# Patient Record
Sex: FEMALE | Race: WHITE | Employment: OTHER | URBAN - NONMETROPOLITAN AREA
[De-identification: names, ages, dates, MRNs, and addresses within clinical notes are randomized per-mention and may not be internally consistent; named-entity substitution may affect disease eponyms.]

---

## 2017-08-30 ENCOUNTER — HOSPITAL ENCOUNTER (OUTPATIENT)
Dept: CT IMAGING | Age: 82
Discharge: HOME | End: 2017-08-30
Payer: MEDICARE

## 2017-08-30 DIAGNOSIS — Z87.891 PERSONAL HISTORY OF TOBACCO USE, PRESENTING HAZARDS TO HEALTH: ICD-10-CM

## 2017-08-30 PROCEDURE — G0297 LDCT FOR LUNG CA SCREEN: HCPCS

## 2017-10-20 ENCOUNTER — HOSPITAL ENCOUNTER (OUTPATIENT)
Dept: NUCLEAR MEDICINE | Age: 82
Discharge: HOME | End: 2017-10-20
Payer: MEDICARE

## 2017-10-20 ENCOUNTER — HOSPITAL ENCOUNTER (OUTPATIENT)
Dept: PULMONOLOGY | Age: 82
Discharge: HOME | End: 2017-10-20
Payer: MEDICARE

## 2017-10-20 DIAGNOSIS — R91.1 LUNG NODULE: ICD-10-CM

## 2017-10-20 LAB
BASE EXCESS ARTERIAL: 6.3 MMOL/L (ref -2–2)
CARBOXYHEMOGLOBIN ARTERIAL: 1.8 % (ref 0–5)
GLUCOSE BLD-MCNC: 85 MG/DL (ref 70–99)
HCO3 ARTERIAL: 31.8 MMOL/L (ref 22–26)
HEMOGLOBIN, ART, EXTENDED: 11.4 G/DL (ref 12–16)
METHEMOGLOBIN ARTERIAL: 1.3 %
O2 CONTENT ARTERIAL: 15.4 ML/DL
O2 SAT, ARTERIAL: 95.5 %
O2 THERAPY: ABNORMAL
PCO2 ARTERIAL: 49 MMHG (ref 35–45)
PERFORMED ON: NORMAL
PH ARTERIAL: 7.42 (ref 7.35–7.45)
PO2 ARTERIAL: 83 MMHG (ref 80–100)
POTASSIUM, WHOLE BLOOD: 4.4

## 2017-10-20 PROCEDURE — 36600 WITHDRAWAL OF ARTERIAL BLOOD: CPT

## 2017-10-20 PROCEDURE — 94060 EVALUATION OF WHEEZING: CPT

## 2017-10-20 PROCEDURE — 3430000000 HC RX DIAGNOSTIC RADIOPHARMACEUTICAL: Performed by: NURSE PRACTITIONER

## 2017-10-20 PROCEDURE — 82948 REAGENT STRIP/BLOOD GLUCOSE: CPT

## 2017-10-20 PROCEDURE — 6360000002 HC RX W HCPCS: Performed by: NURSE PRACTITIONER

## 2017-10-20 PROCEDURE — A9552 F18 FDG: HCPCS | Performed by: NURSE PRACTITIONER

## 2017-10-20 PROCEDURE — 84132 ASSAY OF SERUM POTASSIUM: CPT

## 2017-10-20 PROCEDURE — 78815 PET IMAGE W/CT SKULL-THIGH: CPT

## 2017-10-20 PROCEDURE — 94729 DIFFUSING CAPACITY: CPT

## 2017-10-20 PROCEDURE — 82803 BLOOD GASES ANY COMBINATION: CPT

## 2017-10-20 RX ORDER — FLUDEOXYGLUCOSE F 18 200 MCI/ML
10 INJECTION, SOLUTION INTRAVENOUS
Status: COMPLETED | OUTPATIENT
Start: 2017-10-20 | End: 2017-10-20

## 2017-10-20 RX ORDER — ALBUTEROL SULFATE 2.5 MG/3ML
2.5 SOLUTION RESPIRATORY (INHALATION) EVERY 6 HOURS PRN
Status: DISCONTINUED | OUTPATIENT
Start: 2017-10-20 | End: 2017-10-22 | Stop reason: HOSPADM

## 2017-10-20 RX ADMIN — FLUDEOXYGLUCOSE F 18 10 MILLICURIE: 200 INJECTION, SOLUTION INTRAVENOUS at 10:38

## 2017-10-20 NOTE — PROGRESS NOTES
Blood Gas, Arterial [[id number]] (Abnormal) Collected: 10/20/17 1115     Specimen: Blood gases Updated: 10/20/17 1116      pH, Arterial 7.420      pCO2, Arterial 49.0 (H) mmHg       pO2, Arterial 83.0 mmHg       HCO3, Arterial 31.8 (H) mmol/L       Base Excess, Arterial 6.3 (H) mmol/L       Hemoglobin, Art, Extended 11.4 (L) g/dL       O2 Sat, Arterial 95.5 %       Carboxyhgb, Arterial 1.8 %       Methemoglobin, Arterial 1.3 %       O2 Content, Arterial 15.4 mL/dL       O2 Therapy Unknown     Potassium, Whole Blood [[id number]] Collected: 10/20/17 1115      Updated: 10/20/17 1116      Potassium, Whole Blood 4.4     2 LPM, RR 18  SITE RR ATT+

## 2018-10-22 ENCOUNTER — APPOINTMENT (OUTPATIENT)
Dept: GENERAL RADIOLOGY | Age: 83
End: 2018-10-22
Payer: MEDICARE

## 2018-10-22 ENCOUNTER — HOSPITAL ENCOUNTER (EMERGENCY)
Age: 83
Discharge: HOME | End: 2018-10-23
Attending: EMERGENCY MEDICINE
Payer: MEDICARE

## 2018-10-22 VITALS
HEIGHT: 64 IN | HEART RATE: 89 BPM | BODY MASS INDEX: 18.95 KG/M2 | WEIGHT: 111 LBS | DIASTOLIC BLOOD PRESSURE: 64 MMHG | SYSTOLIC BLOOD PRESSURE: 138 MMHG | TEMPERATURE: 98.9 F | OXYGEN SATURATION: 95 % | RESPIRATION RATE: 20 BRPM

## 2018-10-22 DIAGNOSIS — R07.81 RIB PAIN ON RIGHT SIDE: Primary | ICD-10-CM

## 2018-10-22 DIAGNOSIS — R07.89 ATYPICAL CHEST PAIN: ICD-10-CM

## 2018-10-22 PROCEDURE — 96374 THER/PROPH/DIAG INJ IV PUSH: CPT

## 2018-10-22 PROCEDURE — 71045 X-RAY EXAM CHEST 1 VIEW: CPT

## 2018-10-22 PROCEDURE — 2580000003 HC RX 258: Performed by: EMERGENCY MEDICINE

## 2018-10-22 PROCEDURE — 99284 EMERGENCY DEPT VISIT MOD MDM: CPT

## 2018-10-22 PROCEDURE — 6360000002 HC RX W HCPCS: Performed by: EMERGENCY MEDICINE

## 2018-10-22 PROCEDURE — 93005 ELECTROCARDIOGRAM TRACING: CPT

## 2018-10-22 RX ORDER — MORPHINE SULFATE/0.9% NACL/PF 1 MG/ML
2 SYRINGE (ML) INJECTION ONCE
Status: COMPLETED | OUTPATIENT
Start: 2018-10-22 | End: 2018-10-22

## 2018-10-22 RX ORDER — 0.9 % SODIUM CHLORIDE 0.9 %
500 INTRAVENOUS SOLUTION INTRAVENOUS ONCE
Status: COMPLETED | OUTPATIENT
Start: 2018-10-22 | End: 2018-10-23

## 2018-10-22 RX ADMIN — SODIUM CHLORIDE 500 ML: 9 INJECTION, SOLUTION INTRAVENOUS at 23:56

## 2018-10-22 RX ADMIN — Medication 2 MG: at 23:54

## 2018-10-22 ASSESSMENT — ENCOUNTER SYMPTOMS
RHINORRHEA: 0
NAUSEA: 0
BACK PAIN: 0
SORE THROAT: 0
ABDOMINAL PAIN: 0
SHORTNESS OF BREATH: 0
VOMITING: 0
DIARRHEA: 0
WHEEZING: 0
COUGH: 1

## 2018-10-22 ASSESSMENT — PAIN DESCRIPTION - LOCATION: LOCATION: RIB CAGE

## 2018-10-22 ASSESSMENT — PAIN SCALES - GENERAL
PAINLEVEL_OUTOF10: 7
PAINLEVEL_OUTOF10: 7

## 2018-10-22 ASSESSMENT — PAIN DESCRIPTION - ORIENTATION: ORIENTATION: RIGHT

## 2018-10-23 ENCOUNTER — APPOINTMENT (OUTPATIENT)
Dept: CT IMAGING | Age: 83
End: 2018-10-23
Payer: MEDICARE

## 2018-10-23 LAB
ALBUMIN SERPL-MCNC: 3.8 G/DL (ref 3.5–5.2)
ALP BLD-CCNC: 76 U/L (ref 35–104)
ALT SERPL-CCNC: 11 U/L (ref 5–33)
ANION GAP SERPL CALCULATED.3IONS-SCNC: 11 MMOL/L (ref 7–19)
APTT: 26.9 SEC (ref 26–36.2)
AST SERPL-CCNC: 17 U/L (ref 5–32)
BASOPHILS ABSOLUTE: 0.1 K/UL (ref 0–0.2)
BASOPHILS RELATIVE PERCENT: 0.8 % (ref 0–1)
BILIRUB SERPL-MCNC: <0.2 MG/DL (ref 0.2–1.2)
BILIRUBIN URINE: NEGATIVE
BLOOD, URINE: NEGATIVE
BUN BLDV-MCNC: 28 MG/DL (ref 8–23)
CALCIUM SERPL-MCNC: 9.2 MG/DL (ref 8.8–10.2)
CHLORIDE BLD-SCNC: 100 MMOL/L (ref 98–111)
CLARITY: CLEAR
CO2: 29 MMOL/L (ref 22–29)
COLOR: YELLOW
CREAT SERPL-MCNC: 1.2 MG/DL (ref 0.5–0.9)
EKG P AXIS: 71 DEGREES
EKG P-R INTERVAL: 178 MS
EKG Q-T INTERVAL: 362 MS
EKG QRS DURATION: 86 MS
EKG QTC CALCULATION (BAZETT): 409 MS
EKG T AXIS: 71 DEGREES
EOSINOPHILS ABSOLUTE: 0.1 K/UL (ref 0–0.6)
EOSINOPHILS RELATIVE PERCENT: 2 % (ref 0–5)
GFR NON-AFRICAN AMERICAN: 43
GLUCOSE BLD-MCNC: 120 MG/DL (ref 74–109)
GLUCOSE URINE: NEGATIVE MG/DL
HCT VFR BLD CALC: 38.2 % (ref 37–47)
HEMOGLOBIN: 11.8 G/DL (ref 12–16)
INR BLD: 0.95 (ref 0.88–1.18)
KETONES, URINE: NEGATIVE MG/DL
LEUKOCYTE ESTERASE, URINE: NEGATIVE
LYMPHOCYTES ABSOLUTE: 1.5 K/UL (ref 1.1–4.5)
LYMPHOCYTES RELATIVE PERCENT: 22.6 % (ref 20–40)
MCH RBC QN AUTO: 29.9 PG (ref 27–31)
MCHC RBC AUTO-ENTMCNC: 30.9 G/DL (ref 33–37)
MCV RBC AUTO: 97 FL (ref 81–99)
MONOCYTES ABSOLUTE: 0.8 K/UL (ref 0–0.9)
MONOCYTES RELATIVE PERCENT: 13 % (ref 0–10)
NEUTROPHILS ABSOLUTE: 4 K/UL (ref 1.5–7.5)
NEUTROPHILS RELATIVE PERCENT: 61.4 % (ref 50–65)
NITRITE, URINE: NEGATIVE
PDW BLD-RTO: 12.2 % (ref 11.5–14.5)
PERFORMED ON: NORMAL
PERFORMED ON: NORMAL
PH UA: 7.5
PLATELET # BLD: 199 K/UL (ref 130–400)
PMV BLD AUTO: 9 FL (ref 9.4–12.3)
POC TROPONIN I: 0.01 NG/ML (ref 0–0.08)
POC TROPONIN I: 0.04 NG/ML (ref 0–0.08)
POTASSIUM SERPL-SCNC: 4.3 MMOL/L (ref 3.5–5)
PROTEIN UA: NEGATIVE MG/DL
PROTHROMBIN TIME: 12.6 SEC (ref 12–14.6)
RBC # BLD: 3.94 M/UL (ref 4.2–5.4)
SODIUM BLD-SCNC: 140 MMOL/L (ref 136–145)
SPECIFIC GRAVITY UA: 1.02
TOTAL PROTEIN: 6.6 G/DL (ref 6.6–8.7)
URINE REFLEX TO CULTURE: NORMAL
UROBILINOGEN, URINE: 0.2 E.U./DL
WBC # BLD: 6.5 K/UL (ref 4.8–10.8)

## 2018-10-23 PROCEDURE — 81003 URINALYSIS AUTO W/O SCOPE: CPT

## 2018-10-23 PROCEDURE — 36415 COLL VENOUS BLD VENIPUNCTURE: CPT

## 2018-10-23 PROCEDURE — 71275 CT ANGIOGRAPHY CHEST: CPT

## 2018-10-23 PROCEDURE — 99284 EMERGENCY DEPT VISIT MOD MDM: CPT | Performed by: EMERGENCY MEDICINE

## 2018-10-23 PROCEDURE — 84484 ASSAY OF TROPONIN QUANT: CPT

## 2018-10-23 PROCEDURE — 85610 PROTHROMBIN TIME: CPT

## 2018-10-23 PROCEDURE — 80053 COMPREHEN METABOLIC PANEL: CPT

## 2018-10-23 PROCEDURE — 6370000000 HC RX 637 (ALT 250 FOR IP): Performed by: EMERGENCY MEDICINE

## 2018-10-23 PROCEDURE — 85730 THROMBOPLASTIN TIME PARTIAL: CPT

## 2018-10-23 PROCEDURE — 6360000004 HC RX CONTRAST MEDICATION: Performed by: EMERGENCY MEDICINE

## 2018-10-23 PROCEDURE — 85025 COMPLETE CBC W/AUTO DIFF WBC: CPT

## 2018-10-23 RX ORDER — LIDOCAINE 50 MG/G
1 PATCH TOPICAL DAILY
Status: DISCONTINUED | OUTPATIENT
Start: 2018-10-23 | End: 2018-10-23 | Stop reason: HOSPADM

## 2018-10-23 RX ORDER — HYDROCODONE BITARTRATE AND ACETAMINOPHEN 5; 325 MG/1; MG/1
1 TABLET ORAL EVERY 6 HOURS PRN
Qty: 10 TABLET | Refills: 0 | Status: SHIPPED | OUTPATIENT
Start: 2018-10-23 | End: 2018-10-26

## 2018-10-23 RX ORDER — LIDOCAINE 50 MG/G
1 PATCH TOPICAL DAILY
Qty: 30 PATCH | Refills: 0 | Status: SHIPPED | OUTPATIENT
Start: 2018-10-23

## 2018-10-23 RX ADMIN — IOPAMIDOL 90 ML: 755 INJECTION, SOLUTION INTRAVENOUS at 00:58

## 2018-10-23 NOTE — ED PROVIDER NOTES
MEDICALHISTORY   No past medical history on file. SURGICAL HISTORY     No past surgical history on file. CURRENT MEDICATIONS     Discharge Medication List as of 10/23/2018  3:52 AM          ALLERGIES     Patient has no known allergies. FAMILY HISTORY     No family history on file. SOCIAL HISTORY       Social History     Social History    Marital status:      Spouse name: N/A    Number of children: N/A    Years of education: N/A     Social History Main Topics    Smoking status: Not on file    Smokeless tobacco: Not on file    Alcohol use Not on file    Drug use: Unknown    Sexual activity: Not on file     Other Topics Concern    Not on file     Social History Narrative    No narrative on file       SCREENINGS    Dawson Coma Scale  Eye Opening: Spontaneous  Best Verbal Response: Oriented  Best Motor Response: Obeys commands  Sarah Coma Scale Score: 15        PHYSICAL EXAM    (up to 7 for level 4, 8 or more for level 5)     ED Triage Vitals [10/22/18 2320]   BP Temp Temp Source Pulse Resp SpO2 Height Weight   138/64 98.9 °F (37.2 °C) Oral 89 20 95 % 5' 4\" (1.626 m) 111 lb (50.3 kg)       Physical Exam   Constitutional: She is oriented to person, place, and time. She appears well-developed and well-nourished. No distress. HENT:   Head: Normocephalic and atraumatic. Right Ear: External ear normal.   Left Ear: External ear normal.   Eyes: Conjunctivae and EOM are normal.   Neck: Normal range of motion. No tracheal deviation present. Cardiovascular: Normal rate, regular rhythm, normal heart sounds and intact distal pulses. No murmur heard. Pulmonary/Chest: No accessory muscle usage. No tachypnea. No respiratory distress. She has decreased breath sounds in the right lower field and the left lower field. She has no wheezes. She has no rhonchi. She has no rales. She exhibits tenderness and bony tenderness. She exhibits no mass, no crepitus, no deformity and no retraction. Abdominal: Soft. She exhibits no mass. There is no tenderness. There is no guarding. Musculoskeletal: Normal range of motion. She exhibits no edema. Neurological: She is alert and oriented to person, place, and time. GCS eye subscore is 4. GCS verbal subscore is 5. GCS motor subscore is 6. Skin: Skin is warm and dry. She is not diaphoretic. Vitals reviewed. DIAGNOSTIC RESULTS     EKG: All EKG's areinterpreted by the Emergency Department Physician who either signs or Co-signs this chart in the absence of a cardiologist.    88, normal sinus rhythm, nondiagnostic EKG    RADIOLOGY:  Non-plain film images such as CT, Ultrasound and MRI are read by the radiologist. Plain radiographic images are visualized and preliminarily interpreted bythe emergency physician with the below findings:          CTA PULMONARY W CONTRAST   Final Result   No evidence of pulmonary embolism. Chronic emphysematous and inflammatory lung changes. Atheromatous changes of the thoracic aorta and coronary arteries. Pulmonary arterial hypertension. Chronic bronchitis with possible mucosal impaction and right middle   lobar atelectasis. Multiple low density nodules/masses in both kidneys probably represent   renal cysts. Further evaluation/correlation with sonography may be   obtained. The above study was initially reviewed and reported by [facility]. I do   not find any discrepancies. Signed by Dr Brennon Francisco on 10/23/2018 7:17 AM      XR CHEST PORTABLE   Final Result   Right heart enlargement. Emphysematous changes with   hyperinflation and atelectasis in the right lung base. Signed by Dr Pravin Schneider on 10/23/2018 6:59 AM      CTACHEST:  No PE. No fractures. No infiltrate, effusion, or pneumothorax. Emphysema, peribronchial thickening,  and right middle lobe mucoid impaction with corresponding mild atelectasis.         LABS:  Labs Reviewed   CBC WITH AUTO DIFFERENTIAL - Abnormal; Notable for the following: patient's illness. I have discussed the risks of use of these medications with patient. CONSULTS:  None    PROCEDURES:  Unless otherwise noted below, none     Procedures    FINAL IMPRESSION      1. Rib pain on right side    2. Atypical chest pain          DISPOSITION/PLAN   DISPOSITION        PATIENT REFERRED TO:  Artur Osman MD  [address] [place] [address]  [phone]    Schedule an appointment as soon as possible for a visit in 2 days        DISCHARGE MEDICATIONS:  Discharge Medication List as of 10/23/2018  3:52 AM      START taking these medications    Details   lidocaine (LIDODERM) 5 % Place 1 patch onto the skin daily 12 hours on, 12 hours off., Disp-30 patch, R-0Print      HYDROcodone-acetaminophen (NORCO) 5-325 MG per tablet Take 1 tablet by mouth every 6 hours as needed for Pain for up to 3 days. Intended supply: 3 days.  Take lowest dose possible to manage pain., Disp-10 tablet, R-0Print                (Please note that portions of this note were completed with a voice recognition program.  Efforts were made to edit thedictations but occasionally words are mis-transcribed.)    Denisa Eden MD (electronically signed)  Attending Emergency Physician        Yoli Cueva MD  10/23/18 0045

## 2018-10-23 NOTE — ED TRIAGE NOTES
Patient arrive by [facility] from home with c/o right rib pain. Patient states the pain began 4 days ago on the left, moves at times, and is now on the right.

## 2019-04-08 ENCOUNTER — TELEPHONE (OUTPATIENT)
Dept: PULMONOLOGY | Facility: CLINIC | Age: 84
End: 2019-04-08

## 2019-04-08 DIAGNOSIS — J44.9 CHRONIC OBSTRUCTIVE PULMONARY DISEASE, UNSPECIFIED COPD TYPE (HCC): Primary | ICD-10-CM

## 2019-04-08 RX ORDER — ARFORMOTEROL TARTRATE 15 UG/2ML
15 SOLUTION RESPIRATORY (INHALATION)
Qty: 360 ML | Refills: 3 | Status: CANCELLED | OUTPATIENT
Start: 2019-04-08

## 2019-04-08 RX ORDER — ARFORMOTEROL TARTRATE 15 UG/2ML
15 SOLUTION RESPIRATORY (INHALATION)
Qty: 360 ML | Refills: 3 | OUTPATIENT
Start: 2019-04-08

## 2019-04-08 NOTE — TELEPHONE ENCOUNTER
I do not see that she has a follow-up appointment.  She will need to make a follow-up appointment before we can refill any medications.

## 2019-04-08 NOTE — TELEPHONE ENCOUNTER
Per my other note, I do not see that she has a follow-up appointment.  We cannot authorize refills unless she has a follow-up appointment.

## 2020-03-09 VITALS
WEIGHT: 114 LBS | BODY MASS INDEX: 19.57 KG/M2 | SYSTOLIC BLOOD PRESSURE: 118 MMHG | OXYGEN SATURATION: 92 % | DIASTOLIC BLOOD PRESSURE: 70 MMHG | HEART RATE: 85 BPM

## 2020-03-20 ENCOUNTER — TELEPHONE (OUTPATIENT)
Dept: PULMONOLOGY | Facility: CLINIC | Age: 85
End: 2020-03-20

## 2020-03-20 NOTE — TELEPHONE ENCOUNTER
This patient is having problems getting Brovana. And was wanting Dr. Keen to change it.    Spoke to the patient and she said she has spoken to Dr. Montilla' office and they have been working on this.    Patient notified she has not been in our office for almost 2 years. She will contact Dr. Montilla' office.

## 2020-04-01 VITALS
BODY MASS INDEX: 19.57 KG/M2 | HEART RATE: 85 BPM | WEIGHT: 114 LBS | DIASTOLIC BLOOD PRESSURE: 70 MMHG | SYSTOLIC BLOOD PRESSURE: 118 MMHG

## 2020-04-01 PROBLEM — D69.6 THROMBOCYTOPENIA (HCC): Status: ACTIVE | Noted: 2020-04-01

## 2020-04-01 PROBLEM — D50.9 IRON DEFICIENCY ANEMIA: Status: ACTIVE | Noted: 2020-04-01

## 2020-04-01 SDOH — HEALTH STABILITY: MENTAL HEALTH: HOW OFTEN DO YOU HAVE A DRINK CONTAINING ALCOHOL?: NEVER

## 2020-04-20 ENCOUNTER — HOSPITAL ENCOUNTER (OUTPATIENT)
Dept: INFUSION THERAPY | Age: 85
End: 2020-04-20

## 2020-06-01 NOTE — PROGRESS NOTES
Progress Note      Pt Name: Ana María Number: 5/13/1932  MRN: [id number]    Date of evaluation: 6/3/2020  History Obtained From:  patient, electronic medical record    CHIEF COMPLAINT:    Chief Complaint   Patient presents with    Other     Immune thrombocytopenia      Current active problems  History ITP    HISTORY OF PRESENT ILLNESS:    Mary Kay Lainez is a 80 y.o.  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 She responded to Gamimune and prednisone. She denies any issue with bleeding or bruising. She reports that she has not been anywhere and was told that her platelet count has dropped. Overall she feels she is doing very well for 80years old. She has had difficulty getting around because her vehicle has stopped working and she has to get a ride now. She does have COPD and uses an inhaler and finds it hard to breathe when the humidity starts increasing. Her blood pressure has been stable on her amlodipine, lisinopril. She takes vitamin D daily. She continues taking Zocor but does not know her cholesterol. She follows up with Dr. Ambreen Cormier next week for her routine six-month follow-up. HEMATOLOGIC HISTORY: ITP 10/08/13  Ms. Ankur Richmond was seen in initial hematologic evaluation on 10/08/13 as an inpatient at hospitals, at the request of Dr. Jose Raul Gaytan for thrombocytopenia. She presented to the emergency department via EMS after developing acute epistaxis. This occured 10/07/13, progressively worsening throughout the day and profusely today. Her platelet count upon presentation in the emergency room was 5,000. Her left nare was packed and hematology consultation was requested. Laboratory review revealed WBC of 4.5 with slight increase in monocytes. Hgb was 10.8 and Platelet count 9,048. PT and PTT were normal at 10.4 and 22.7, respectively. Chemistery profile showed a mildly elevated potassium of 5.3. Her creatinine was 2.0. Ms. Ankur Richmond was transferred to

## 2020-06-03 ENCOUNTER — HOSPITAL ENCOUNTER (OUTPATIENT)
Dept: INFUSION THERAPY | Age: 85
Discharge: HOME | End: 2020-06-03
Payer: MEDICARE

## 2020-06-03 ENCOUNTER — OFFICE VISIT (OUTPATIENT)
Dept: HEMATOLOGY | Age: 85
End: 2020-06-03
Payer: MEDICARE

## 2020-06-03 VITALS
WEIGHT: 111.6 LBS | DIASTOLIC BLOOD PRESSURE: 68 MMHG | TEMPERATURE: 97.4 F | SYSTOLIC BLOOD PRESSURE: 120 MMHG | BODY MASS INDEX: 19.05 KG/M2 | OXYGEN SATURATION: 89 % | HEART RATE: 107 BPM | HEIGHT: 64 IN

## 2020-06-03 DIAGNOSIS — D69.6 THROMBOCYTOPENIA (HCC): ICD-10-CM

## 2020-06-03 DIAGNOSIS — D75.89 MACROCYTOSIS: ICD-10-CM

## 2020-06-03 DIAGNOSIS — D50.9 IRON DEFICIENCY ANEMIA, UNSPECIFIED IRON DEFICIENCY ANEMIA TYPE: ICD-10-CM

## 2020-06-03 LAB
ALBUMIN SERPL-MCNC: 4.1 G/DL (ref 3.5–5.2)
ALP BLD-CCNC: 65 U/L (ref 35–104)
ALT SERPL-CCNC: 10 U/L (ref 9–52)
ANION GAP SERPL CALCULATED.3IONS-SCNC: 7 MMOL/L (ref 7–19)
AST SERPL-CCNC: 23 U/L (ref 14–36)
BASOPHILS ABSOLUTE: 0.07 K/UL (ref 0.01–0.08)
BASOPHILS RELATIVE PERCENT: 1.2 % (ref 0.1–1.2)
BILIRUB SERPL-MCNC: 0.4 MG/DL (ref 0.2–1.3)
BUN BLDV-MCNC: 27 MG/DL (ref 7–17)
CALCIUM SERPL-MCNC: 9.1 MG/DL (ref 8.4–10.2)
CHLORIDE BLD-SCNC: 99 MMOL/L (ref 98–111)
CO2: 34 MMOL/L (ref 22–29)
CREAT SERPL-MCNC: 1.3 MG/DL (ref 0.5–1)
EOSINOPHILS ABSOLUTE: 0.28 K/UL (ref 0.04–0.54)
EOSINOPHILS RELATIVE PERCENT: 4.7 % (ref 0.7–7)
FOLATE: 13.3 NG/ML (ref 2.7–20)
GFR NON-AFRICAN AMERICAN: 39
GLOBULIN: 2.3 G/DL
GLUCOSE BLD-MCNC: 188 MG/DL (ref 74–106)
HCT VFR BLD CALC: 40.3 % (ref 34.1–44.9)
HEMOGLOBIN: 12.4 G/DL (ref 11.2–15.7)
LYMPHOCYTES ABSOLUTE: 1.46 K/UL (ref 1.18–3.74)
LYMPHOCYTES RELATIVE PERCENT: 24.5 % (ref 19.3–53.1)
MCH RBC QN AUTO: 30.9 PG (ref 25.6–32.2)
MCHC RBC AUTO-ENTMCNC: 30.8 G/DL (ref 32.3–35.5)
MCV RBC AUTO: 100.5 FL (ref 79.4–94.8)
MONOCYTES ABSOLUTE: 0.86 K/UL (ref 0.24–0.82)
MONOCYTES RELATIVE PERCENT: 14.4 % (ref 4.7–12.5)
NEUTROPHILS ABSOLUTE: 3.29 K/UL (ref 1.56–6.13)
NEUTROPHILS RELATIVE PERCENT: 55.2 % (ref 34–71.1)
PDW BLD-RTO: 12.8 % (ref 11.7–14.4)
PLATELET # BLD: 199 K/UL (ref 182–369)
PMV BLD AUTO: 9.5 FL (ref 7.4–10.4)
POTASSIUM SERPL-SCNC: 5.3 MMOL/L (ref 3.5–5.1)
RBC # BLD: 4.01 M/UL (ref 3.93–5.22)
SODIUM BLD-SCNC: 140 MMOL/L (ref 137–145)
TOTAL PROTEIN: 6.4 G/DL (ref 6.3–8.2)
VITAMIN B-12: 481 PG/ML (ref 239–931)
WBC # BLD: 5.96 K/UL (ref 3.98–10.04)

## 2020-06-03 PROCEDURE — 85025 COMPLETE CBC W/AUTO DIFF WBC: CPT

## 2020-06-03 PROCEDURE — 99213 OFFICE O/P EST LOW 20 MIN: CPT | Performed by: PHYSICIAN ASSISTANT

## 2020-06-03 PROCEDURE — 82607 VITAMIN B-12: CPT

## 2020-06-03 PROCEDURE — 80053 COMPREHEN METABOLIC PANEL: CPT

## 2020-06-03 PROCEDURE — 82746 ASSAY OF FOLIC ACID SERUM: CPT

## 2020-06-03 PROCEDURE — 99211 OFF/OP EST MAY X REQ PHY/QHP: CPT

## 2020-06-03 RX ORDER — LISINOPRIL 2.5 MG/1
2.5 TABLET ORAL DAILY
COMMUNITY

## 2020-06-03 RX ORDER — SIMVASTATIN 20 MG
20 TABLET ORAL DAILY
COMMUNITY
Start: 2016-10-28

## 2020-06-03 RX ORDER — TIOTROPIUM BROMIDE 18 UG/1
18 CAPSULE ORAL; RESPIRATORY (INHALATION) DAILY
COMMUNITY
Start: 2016-10-28

## 2020-06-03 RX ORDER — AMLODIPINE BESYLATE 5 MG/1
5 TABLET ORAL DAILY
COMMUNITY

## 2020-06-03 RX ORDER — CARBOXYMETHYLCELLULOSE SODIUM 5 MG/ML
SOLUTION/ DROPS OPHTHALMIC 3 TIMES DAILY PRN
COMMUNITY

## 2020-06-03 RX ORDER — CLONAZEPAM 0.5 MG/1
0.5 TABLET ORAL 2 TIMES DAILY
COMMUNITY
Start: 2016-11-30

## 2020-06-03 RX ORDER — ALBUTEROL SULFATE 90 UG/1
2 AEROSOL, METERED RESPIRATORY (INHALATION) EVERY 4 HOURS PRN
COMMUNITY

## 2020-06-03 RX ORDER — ERGOCALCIFEROL 1.25 MG/1
50000 CAPSULE ORAL WEEKLY
COMMUNITY
End: 2020-06-03

## 2020-06-03 ASSESSMENT — ENCOUNTER SYMPTOMS
EYE DISCHARGE: 0
COLOR CHANGE: 0
SORE THROAT: 0
VOICE CHANGE: 0
BLOOD IN STOOL: 0
ABDOMINAL PAIN: 0
COUGH: 0
TROUBLE SWALLOWING: 0
WHEEZING: 0
DIARRHEA: 0
EYE ITCHING: 0
BACK PAIN: 0
VOMITING: 0
CONSTIPATION: 0
SHORTNESS OF BREATH: 1
ABDOMINAL DISTENTION: 0
PHOTOPHOBIA: 0
NAUSEA: 0

## 2022-11-15 ENCOUNTER — HOSPITAL ENCOUNTER (EMERGENCY)
Age: 87
Discharge: HOME | End: 2022-11-15
Payer: MEDICARE

## 2022-11-15 ENCOUNTER — APPOINTMENT (OUTPATIENT)
Dept: GENERAL RADIOLOGY | Age: 87
End: 2022-11-15
Payer: MEDICARE

## 2022-11-15 ENCOUNTER — APPOINTMENT (OUTPATIENT)
Dept: CT IMAGING | Age: 87
End: 2022-11-15
Payer: MEDICARE

## 2022-11-15 VITALS
OXYGEN SATURATION: 96 % | RESPIRATION RATE: 17 BRPM | HEART RATE: 96 BPM | TEMPERATURE: 98 F | HEIGHT: 64 IN | BODY MASS INDEX: 17.24 KG/M2 | WEIGHT: 101 LBS | DIASTOLIC BLOOD PRESSURE: 72 MMHG | SYSTOLIC BLOOD PRESSURE: 130 MMHG

## 2022-11-15 DIAGNOSIS — M51.36 LUMBAR DEGENERATIVE DISC DISEASE: ICD-10-CM

## 2022-11-15 DIAGNOSIS — N28.1 RENAL CYST: ICD-10-CM

## 2022-11-15 DIAGNOSIS — M54.50 ACUTE LOW BACK PAIN WITHOUT SCIATICA, UNSPECIFIED BACK PAIN LATERALITY: ICD-10-CM

## 2022-11-15 DIAGNOSIS — K80.80 BILIARY CALCULUS OF OTHER SITE WITHOUT OBSTRUCTION: Primary | ICD-10-CM

## 2022-11-15 DIAGNOSIS — M47.812 CERVICAL SPONDYLOSIS: ICD-10-CM

## 2022-11-15 LAB
BILIRUBIN URINE: NEGATIVE
BLOOD, URINE: NEGATIVE
CLARITY: CLEAR
COLOR: YELLOW
GLUCOSE URINE: NEGATIVE MG/DL
KETONES, URINE: NEGATIVE MG/DL
LEUKOCYTE ESTERASE, URINE: NEGATIVE
NITRITE, URINE: NEGATIVE
PH UA: 7 (ref 5–8)
PROTEIN UA: NEGATIVE MG/DL
SPECIFIC GRAVITY UA: 1.01 (ref 1–1.03)
UROBILINOGEN, URINE: 0.2 E.U./DL

## 2022-11-15 PROCEDURE — 73502 X-RAY EXAM HIP UNI 2-3 VIEWS: CPT

## 2022-11-15 PROCEDURE — 99284 EMERGENCY DEPT VISIT MOD MDM: CPT

## 2022-11-15 PROCEDURE — 96374 THER/PROPH/DIAG INJ IV PUSH: CPT

## 2022-11-15 PROCEDURE — 6360000002 HC RX W HCPCS: Performed by: PHYSICIAN ASSISTANT

## 2022-11-15 PROCEDURE — 81003 URINALYSIS AUTO W/O SCOPE: CPT

## 2022-11-15 PROCEDURE — 6370000000 HC RX 637 (ALT 250 FOR IP): Performed by: PHYSICIAN ASSISTANT

## 2022-11-15 PROCEDURE — 72125 CT NECK SPINE W/O DYE: CPT

## 2022-11-15 PROCEDURE — 72131 CT LUMBAR SPINE W/O DYE: CPT

## 2022-11-15 RX ORDER — LIDOCAINE 50 MG/G
OINTMENT TOPICAL
Qty: 50 G | Refills: 0 | Status: SHIPPED | OUTPATIENT
Start: 2022-11-15

## 2022-11-15 RX ORDER — MORPHINE SULFATE 2 MG/ML
2 INJECTION, SOLUTION INTRAMUSCULAR; INTRAVENOUS ONCE
Status: DISCONTINUED | OUTPATIENT
Start: 2022-11-15 | End: 2022-11-15 | Stop reason: HOSPADM

## 2022-11-15 RX ORDER — ACETAMINOPHEN 500 MG
1000 TABLET ORAL
Status: COMPLETED | OUTPATIENT
Start: 2022-11-15 | End: 2022-11-15

## 2022-11-15 RX ORDER — NAPROXEN 500 MG/1
500 TABLET ORAL 2 TIMES DAILY WITH MEALS
Qty: 12 TABLET | Refills: 1 | Status: SHIPPED | OUTPATIENT
Start: 2022-11-15

## 2022-11-15 RX ORDER — ONDANSETRON 2 MG/ML
4 INJECTION INTRAMUSCULAR; INTRAVENOUS ONCE
Status: COMPLETED | OUTPATIENT
Start: 2022-11-15 | End: 2022-11-15

## 2022-11-15 RX ADMIN — ACETAMINOPHEN 1000 MG: 500 TABLET ORAL at 09:51

## 2022-11-15 RX ADMIN — ONDANSETRON 4 MG: 2 INJECTION INTRAMUSCULAR; INTRAVENOUS at 09:52

## 2022-11-15 ASSESSMENT — ENCOUNTER SYMPTOMS
BACK PAIN: 1
VOMITING: 0
RESPIRATORY NEGATIVE: 1
ABDOMINAL PAIN: 0
NAUSEA: 0
DIARRHEA: 0
GASTROINTESTINAL NEGATIVE: 1
EYES NEGATIVE: 1

## 2022-11-15 ASSESSMENT — PAIN SCALES - GENERAL: PAINLEVEL_OUTOF10: 5

## 2022-11-15 NOTE — ED PROVIDER NOTES
[facility] EMERGENCY DEPT  EMERGENCY DEPARTMENT ENCOUNTER      Pt Name: Laly Zhang  MRN: [id number]  [place] 5/13/1932  Date of evaluation: 11/15/2022  Provider: Mamie Abdi PA-C    CHIEF COMPLAINT       Chief Complaint   Patient presents with    Back Pain     Chronic, worse x 3 days         HISTORY OF PRESENT ILLNESS   (Location/Symptom, Timing/Onset, Context/Setting, Quality, Duration, Modifying Factors, Severity)  Note limiting factors. HPI      Laly Zhang is a 80 y.o. female who presents to the emergency department via EMS with neck and back pain. PMH significant for CHF, history of AAA, hypertension, migraines, COPD with long-term 2 L nasal cannula oxygen, arthritis. Patient states approximately 2 weeks ago she was sitting in [place] when she turned her head to the right and developed a sudden pain and tightness on the right side of her neck. Patient reports since that time the pain is radiated down to her lumbar spine, into her right hip. She describes \"it just keeps cycling between all these areas. \" Patient states that she uses a cane at baseline and has had increased difficulty with walking due to the pain in her lumbar spine. Patient denies numbness or weakness of her extremities, saddle anesthesia. Patient denies incontinence of bowel or bladder. Patient states that due to the pain she is not getting up and cooking food for herself as frequently because of this she is eating less and has since developed decreased urination and dysuria over the past few days. Patient denies abdominal pain, nausea, vomiting, diarrhea, fevers, chills, or diaphoresis. Patient denies any other falls, trauma, new movement/lifting/twisting/bending. Patient has been trying to take Tylenol with her last dose yesterday as well as apply lidocaine however describes that the pain continues to return and she \"can no longer take it. \"        Records reviewed show patient was last seen in the ED on 10/22/2018 for right rib pain, atypical chest pain. Patient last in an outpatient setting at hematology oncology office on 6/3/2020 for monitoring of immune thrombocytopenia, macrocytosis, monocytosis. Nursing Notes were reviewed. REVIEW OF SYSTEMS    (2-9 systems for level 4, 10 or more for level 5)     Review of Systems   Constitutional: Negative. Negative for chills, diaphoresis and fever. Eyes: Negative. Respiratory: Negative. Cardiovascular: Negative. Gastrointestinal: Negative. Negative for abdominal pain, diarrhea, nausea and vomiting. Genitourinary:  Positive for decreased urine volume and dysuria. Negative for flank pain. Denies urinary retention   Musculoskeletal:  Positive for arthralgias (right hip), back pain (lower), gait problem (due to pain in back) and neck pain. Skin: Negative. Negative for rash and wound. Neurological:  Negative for weakness and numbness. Denies saddle anesthesia  Denies incontinence of bowel or bladder   Psychiatric/Behavioral: Negative. All other systems reviewed and are negative. Except as noted above the remainder of the review of systems was reviewed and negative. PAST MEDICAL HISTORY     Past Medical History:   Diagnosis Date    Abdominal aortic aneurysm (AAA)     Anemia     iron deficiecy    Anxiety     Arthritis     CHF (congestive heart failure) (HCC)     COPD (chronic obstructive pulmonary disease) ([facility] [address]     Depression     H/O idiopathic thrombocytopenic purpura     Hypertension     Migraines          SURGICAL HISTORY       Past Surgical History:   Procedure Laterality Date    BONE MARROW BIOPSY      COLONOSCOPY  05/28/2013    @[facility] per Deidre TORRE AND BSO (CERVIX REMOVED)           CURRENT MEDICATIONS       Discharge Medication List as of 11/15/2022 11:00 AM        CONTINUE these medications which have NOT CHANGED    Details   clonazePAM (KLONOPIN) 0.5 MG tablet Take 0.5 mg by mouth 2 times daily. Historical Med      simvastatin (ZOCOR) 20 MG tablet Take 20 mg by mouth dailyHistorical Med      amLODIPine (NORVASC) 5 MG tablet Take 5 mg by mouth dailyHistorical Med      albuterol sulfate  (90 Base) MCG/ACT inhaler Inhale 2 puffs into the lungs every 4 hours as neededHistorical Med      carboxymethylcellulose (REFRESH PLUS) 0.5 % SOLN ophthalmic solution 3 times daily as neededHistorical Med      tiotropium (SPIRIVA HANDIHALER) 18 MCG inhalation capsule Inhale 18 mcg into the lungs dailyHistorical Med      lisinopril (PRINIVIL;ZESTRIL) 2.5 MG tablet Take 2.5 mg by mouth dailyHistorical Med      Cholecalciferol (VITAMIN D3) 125 MCG (5000 UT) TABS Take 1 capsule by mouth dailyHistorical Med      lidocaine (LIDODERM) 5 % Place 1 patch onto the skin daily 12 hours on, 12 hours off., Disp-30 patch, R-0Print             ALLERGIES     Patient has no known allergies. FAMILY HISTORY     History reviewed. No pertinent family history. SOCIAL HISTORY       Social History     Socioeconomic History    Marital status:      Spouse name: None    Number of children: None    Years of education: None    Highest education level: None   Tobacco Use    Smoking status: Former    Smokeless tobacco: Never   Substance and Sexual Activity    Alcohol use: Never    Drug use: Never       SCREENINGS         Sarah Coma Scale  Eye Opening: Spontaneous  Best Verbal Response: Oriented  Best Motor Response: Obeys commands  [place] Coma Scale Score: 15                     CIWA Assessment  BP: 130/72  Heart Rate: 96                 PHYSICAL EXAM    (up to 7 for level 4, 8 or more for level 5)     ED Triage Vitals [11/15/22 0819]   BP Temp Temp Source Heart Rate Resp SpO2 Height Weight   136/67 98 °F (36.7 °C) Oral 96 17 95 % 5' 4\" (1.626 m) 101 lb (45.8 kg)       Physical Exam  Vitals and nursing note reviewed. Constitutional:       General: She is in acute distress (appears uncomfortable due to pain). Appearance: Normal appearance.  She is well-developed, well-groomed and underweight. She is not toxic-appearing or diaphoretic. HENT:      Head: Normocephalic and atraumatic. Mouth/Throat:      Mouth: Mucous membranes are moist.      Pharynx: Oropharynx is clear. Eyes:      Conjunctiva/sclera: Conjunctivae normal.      Pupils: Pupils are equal, round, and reactive to light. Cardiovascular:      Rate and Rhythm: Tachycardia present. Pulmonary:      Effort: Pulmonary effort is normal.      Breath sounds: Normal breath sounds. Chest:      Chest wall: No tenderness. Abdominal:      General: Bowel sounds are normal.      Palpations: Abdomen is soft. Tenderness: There is no abdominal tenderness. There is no right CVA tenderness or left CVA tenderness. Musculoskeletal:         General: Tenderness (inferior half of lumbar midline, right lumbar paraspinal muscualr region; right lateral hip) present. No signs of injury. Cervical back: Normal range of motion and neck supple. Tenderness (right sided paraspinal musicular region, inferior midline) present. No rigidity. Right lower leg: No edema. Left lower leg: No edema. Comments: Full but painful range of motion of the right hip with distally no bony tenderness, no joint swelling. Normal inspection of bilateral upper extremities as well as left lower extremities with no joint swelling, no decreased range of motion, no bony tenderness. Midline tenderness to the lower aspect of the cervical as well as lumbar spine with right-sided paraspinal soft tissue tenderness. No abnormalities to the left paraspinal muscular region throughout the spine. No thoracic midline spinal tenderness. No overlying injuries to the back. Skin:     Findings: No signs of injury, rash or wound. Neurological:      Mental Status: She is alert and oriented to person, place, and time. Sensory: Sensation is intact.    Psychiatric:         Mood and Affect: Mood normal.         Speech: Speech normal. Behavior: Behavior normal. Behavior is cooperative. DIAGNOSTIC RESULTS     EKG: All EKG's are interpreted by the Emergency Department Physician who either signs or Co-signs this chart in the absence of a cardiologist.        RADIOLOGY:   Non-plain film images such as CT, Ultrasound and MRI are read by the radiologist. Plain radiographic images are visualized and preliminarily interpreted by the emergency physician with the below findings:        Interpretation per the Radiologist below, if available at the time of this note:    XR HIP 2-3 VW W PELVIS RIGHT   Final Result   The bones are diffusely demineralized. No acute osseous abnormality. No fracture. No dislocation. Mild to moderate right and mild left hip degenerative changes. Mild bilateral sacroiliac and moderate lower lumbar degenerative changes. The soft tissues appear unremarkable. CT LUMBAR SPINE WO CONTRAST   Final Result   1. No acute osseous abnormality of the lumbar spine. 2.Mild multilevel lumbar spondylosis. If there is ongoing concern for   neurologic impingement, recommend lumbar spine MRI. 3.Multiple indeterminate renal cystic lesions. Recommend nonemergent renal   ultrasound for characterization. 4.Cholelithiasis without cholecystitis. CT CERVICAL SPINE WO CONTRAST   Final Result   No acute osseous abnormality of the cervical spine. Mild multilevel cervical spondylosis. Mild right foraminal narrowing at C3-4 due   to uncinate/facet hypertrophy. No high-grade central canal stenosis is seen. If   there is ongoing concern for neural impingement, recommend cervical spine MRI. LABS:  Labs Reviewed   URINALYSIS       All other labs were within normal range or not returned as of this dictation.     EMERGENCY DEPARTMENT COURSE and DIFFERENTIAL DIAGNOSIS/MDM:   Vitals:    Vitals:    11/15/22 0819 11/15/22 1053   BP: 136/67 130/72   Pulse: 96 96   Resp: 17 17   Temp: 98 °F (36.7 °C)    TempSrc: Oral    SpO2: 95% 96% Weight: 101 lb (45.8 kg)    Height: 5' 4\" (1.626 m)            MDM     Amount and/or Complexity of Data Reviewed  Clinical lab tests: reviewed and ordered  Tests in the radiology section of CPT®: reviewed and ordered  Tests in the medicine section of CPT®: ordered and reviewed  Decide to obtain previous medical records or to obtain history from someone other than the patient: yes  Review and summarize past medical records: yes  Discuss the patient with other providers: yes          REASSESSMENT        CONSULTS:  None    PROCEDURES:  Unless otherwise noted below, none     Procedures        Leida Cesar is a [address] y.o. female who presents to the emergency department via EMS with neck and back pain. PMH significant for CHF, history of AAA, hypertension, migraines, COPD with long-term 2 L nasal cannula oxygen, arthritis. Cervical spine CT showed: No acute abnormalities, multilevel cervical spondylosis and foraminal narrowing at C3-C4. Lumbar spine CT showed: No acute abnormalities, mild multilevel lumbar spondylosis, multiple indeterminant cystic renal lesions, cholelithiasis without cholecystitis. X-ray imaging of the right hip showed: Demineralized bones, no acute abnormalities, mild to moderate right and mild left hip degenerative changes, bilateral mild sacroiliac and moderate lower lumbar degenerative changes. Urinalysis revealed no acute findings. Patient was given a dose of Tylenol and offered pain medication however after the Tylenol she was able to rest comfortably with significantly improved pain, ability to ambulate at her baseline, and stable vital signs. Discussed with patient degenerative changes and need for close follow-up with her primary care provider within the next 48 hours to initiate symptomatic treatment, physical therapy. Advised use of anti-inflammatories, topical lidocaine.   Discussed tricked return precautions and need for immediate return to ED should she develop any new or worsening symptoms. Discussed incidental findings of cholelithiasis, renal cyst, and ability to follow-up with her primary care provider for long-term monitoring and management. Patient is appreciative with no further questions, concerns, needs at this time and is stable for discharge. FINAL IMPRESSION      1. Biliary calculus of other site without obstruction    2. Renal cyst    3. Cervical spondylosis    4. Lumbar degenerative disc disease    5. Acute low back pain without sciatica, unspecified back pain laterality          DISPOSITION/PLAN   DISPOSITION Decision To Discharge 11/15/2022 10:55:28 AM      PATIENT REFERRED TO:  Warden Arredondo  [facility] [address]  [phone]    Schedule an appointment as soon as possible for a visit in 2 days      [facility] EMERGENCY DEPT  [address]undio Florentino  [phone]    As needed    DISCHARGE MEDICATIONS:  Discharge Medication List as of 11/15/2022 11:00 AM        START taking these medications    Details   lidocaine (XYLOCAINE) 5 % ointment Apply topically as needed. , Disp-50 g, R-0, Print      naproxen (NAPROSYN) 500 MG tablet Take 1 tablet by mouth 2 times daily (with meals), Disp-12 tablet, R-1Print           Controlled Substances Monitoring:     No flowsheet data found.     (Please note that portions of this note were completed with a voice recognition program.  Efforts were made to edit the dictations but occasionally words are mis-transcribed.)    Emilio Mcdermott PA-C (electronically signed)            Emilio Mcdermott PA-C  11/15/22 [id number]

## 2022-11-15 NOTE — DISCHARGE INSTRUCTIONS
Please continue using anti-inflammatories and the lidocaine gel to help with your pain. Please apply heat to your neck and back followed by gentle range of motion stretching. Please follow-up with your primary care provider within the next 48 hours for reevaluation and to discuss physical therapy. Should you develop any new or worsening symptoms please return to the ER for further evaluation.

## 2023-08-10 ENCOUNTER — APPOINTMENT (OUTPATIENT)
Dept: GENERAL RADIOLOGY | Age: 88
DRG: 947 | End: 2023-08-10
Payer: MEDICARE

## 2023-08-10 ENCOUNTER — APPOINTMENT (OUTPATIENT)
Dept: CT IMAGING | Age: 88
DRG: 947 | End: 2023-08-10
Payer: MEDICARE

## 2023-08-10 ENCOUNTER — HOSPITAL ENCOUNTER (INPATIENT)
Age: 88
LOS: 1 days | Discharge: HOME HEALTH | DRG: 947 | End: 2023-08-11
Attending: STUDENT IN AN ORGANIZED HEALTH CARE EDUCATION/TRAINING PROGRAM | Admitting: HOSPITALIST
Payer: MEDICARE

## 2023-08-10 DIAGNOSIS — R05.2 SUBACUTE COUGH: Primary | ICD-10-CM

## 2023-08-10 DIAGNOSIS — J44.9 CHRONIC OBSTRUCTIVE PULMONARY DISEASE, UNSPECIFIED COPD TYPE (HCC): ICD-10-CM

## 2023-08-10 PROBLEM — R93.89 ABNORMAL CHEST CT: Status: ACTIVE | Noted: 2023-08-10

## 2023-08-10 PROBLEM — Z86.2 HISTORY OF IDIOPATHIC THROMBOCYTOPENIC PURPURA: Status: ACTIVE | Noted: 2023-08-10

## 2023-08-10 PROBLEM — R53.1 WEAKNESS: Status: ACTIVE | Noted: 2023-08-10

## 2023-08-10 PROBLEM — R93.89 ABNORMAL CT OF THE CHEST: Status: ACTIVE | Noted: 2023-08-10

## 2023-08-10 PROBLEM — R06.02 SHORTNESS OF BREATH: Status: ACTIVE | Noted: 2023-08-10

## 2023-08-10 PROBLEM — Z86.2 HISTORY OF ANEMIA: Status: ACTIVE | Noted: 2023-08-10

## 2023-08-10 LAB
ALBUMIN SERPL-MCNC: 3.9 G/DL (ref 3.5–5.2)
ALP SERPL-CCNC: 68 U/L (ref 35–104)
ALT SERPL-CCNC: <5 U/L (ref 5–33)
ANION GAP SERPL CALCULATED.3IONS-SCNC: 7 MMOL/L (ref 7–19)
AST SERPL-CCNC: 13 U/L (ref 5–32)
BASOPHILS # BLD: 0.1 K/UL (ref 0–0.2)
BASOPHILS NFR BLD: 1.9 % (ref 0–1)
BILIRUB SERPL-MCNC: 0.3 MG/DL (ref 0.2–1.2)
BUN SERPL-MCNC: 20 MG/DL (ref 8–23)
CALCIUM SERPL-MCNC: 9.1 MG/DL (ref 8.2–9.6)
CHLORIDE SERPL-SCNC: 103 MMOL/L (ref 98–111)
CO2 SERPL-SCNC: 34 MMOL/L (ref 22–29)
CREAT SERPL-MCNC: 1.1 MG/DL (ref 0.5–0.9)
EOSINOPHIL # BLD: 0.2 K/UL (ref 0–0.6)
EOSINOPHIL NFR BLD: 3.9 % (ref 0–5)
ERYTHROCYTE [DISTWIDTH] IN BLOOD BY AUTOMATED COUNT: 12.6 % (ref 11.5–14.5)
GLUCOSE SERPL-MCNC: 88 MG/DL (ref 74–109)
HCT VFR BLD AUTO: 35.4 % (ref 37–47)
HGB BLD-MCNC: 10.1 G/DL (ref 12–16)
HYPOCHROMIA BLD QL SMEAR: ABNORMAL
IMM GRANULOCYTES # BLD: 0 K/UL
LYMPHOCYTES # BLD: 0.6 K/UL (ref 1.1–4.5)
LYMPHOCYTES NFR BLD: 14.4 % (ref 20–40)
MAGNESIUM SERPL-MCNC: 2.1 MG/DL (ref 1.7–2.3)
MCH RBC QN AUTO: 26.3 PG (ref 27–31)
MCHC RBC AUTO-ENTMCNC: 28.5 G/DL (ref 33–37)
MCV RBC AUTO: 92.2 FL (ref 81–99)
MONOCYTES # BLD: 0.5 K/UL (ref 0–0.9)
MONOCYTES NFR BLD: 12.2 % (ref 0–10)
NEUTROPHILS # BLD: 2.8 K/UL (ref 1.5–7.5)
NEUTS SEG NFR BLD: 67.4 % (ref 50–65)
OVALOCYTES BLD QL SMEAR: ABNORMAL
PLATELET # BLD AUTO: 252 K/UL (ref 130–400)
PLATELET SLIDE REVIEW: ADEQUATE
PMV BLD AUTO: 9 FL (ref 9.4–12.3)
POTASSIUM SERPL-SCNC: 4.7 MMOL/L (ref 3.5–5)
PROT SERPL-MCNC: 6.5 G/DL (ref 6.6–8.7)
RBC # BLD AUTO: 3.84 M/UL (ref 4.2–5.4)
SODIUM SERPL-SCNC: 144 MMOL/L (ref 136–145)
TROPONIN T SERPL-MCNC: <0.01 NG/ML (ref 0–0.03)
WBC # BLD AUTO: 4.1 K/UL (ref 4.8–10.8)

## 2023-08-10 PROCEDURE — 74176 CT ABD & PELVIS W/O CONTRAST: CPT

## 2023-08-10 PROCEDURE — 2580000003 HC RX 258: Performed by: NURSE PRACTITIONER

## 2023-08-10 PROCEDURE — 85025 COMPLETE CBC W/AUTO DIFF WBC: CPT

## 2023-08-10 PROCEDURE — 99285 EMERGENCY DEPT VISIT HI MDM: CPT

## 2023-08-10 PROCEDURE — 36415 COLL VENOUS BLD VENIPUNCTURE: CPT

## 2023-08-10 PROCEDURE — 6360000002 HC RX W HCPCS: Performed by: NURSE PRACTITIONER

## 2023-08-10 PROCEDURE — 71045 X-RAY EXAM CHEST 1 VIEW: CPT

## 2023-08-10 PROCEDURE — 94640 AIRWAY INHALATION TREATMENT: CPT

## 2023-08-10 PROCEDURE — 94760 N-INVAS EAR/PLS OXIMETRY 1: CPT

## 2023-08-10 PROCEDURE — 71250 CT THORAX DX C-: CPT

## 2023-08-10 PROCEDURE — 6370000000 HC RX 637 (ALT 250 FOR IP): Performed by: NURSE PRACTITIONER

## 2023-08-10 PROCEDURE — 1210000000 HC MED SURG R&B

## 2023-08-10 PROCEDURE — 84484 ASSAY OF TROPONIN QUANT: CPT

## 2023-08-10 PROCEDURE — 83735 ASSAY OF MAGNESIUM: CPT

## 2023-08-10 PROCEDURE — 2700000000 HC OXYGEN THERAPY PER DAY

## 2023-08-10 PROCEDURE — 80053 COMPREHEN METABOLIC PANEL: CPT

## 2023-08-10 PROCEDURE — 93005 ELECTROCARDIOGRAM TRACING: CPT

## 2023-08-10 RX ORDER — IPRATROPIUM BROMIDE AND ALBUTEROL SULFATE 2.5; .5 MG/3ML; MG/3ML
1 SOLUTION RESPIRATORY (INHALATION)
Status: DISCONTINUED | OUTPATIENT
Start: 2023-08-10 | End: 2023-08-11 | Stop reason: HOSPADM

## 2023-08-10 RX ORDER — CARBOXYMETHYLCELLULOSE SODIUM 5 MG/ML
2 SOLUTION/ DROPS OPHTHALMIC 3 TIMES DAILY PRN
Status: DISCONTINUED | OUTPATIENT
Start: 2023-08-10 | End: 2023-08-11 | Stop reason: HOSPADM

## 2023-08-10 RX ORDER — SODIUM CHLORIDE 9 MG/ML
INJECTION, SOLUTION INTRAVENOUS CONTINUOUS
Status: DISCONTINUED | OUTPATIENT
Start: 2023-08-10 | End: 2023-08-11

## 2023-08-10 RX ORDER — POLYETHYLENE GLYCOL 3350 17 G/17G
17 POWDER, FOR SOLUTION ORAL DAILY PRN
Status: DISCONTINUED | OUTPATIENT
Start: 2023-08-10 | End: 2023-08-11 | Stop reason: HOSPADM

## 2023-08-10 RX ORDER — CLONAZEPAM 0.5 MG/1
0.5 TABLET ORAL 2 TIMES DAILY
Status: DISCONTINUED | OUTPATIENT
Start: 2023-08-10 | End: 2023-08-11 | Stop reason: HOSPADM

## 2023-08-10 RX ORDER — SODIUM CHLORIDE 0.9 % (FLUSH) 0.9 %
5-40 SYRINGE (ML) INJECTION EVERY 12 HOURS SCHEDULED
Status: DISCONTINUED | OUTPATIENT
Start: 2023-08-10 | End: 2023-08-11 | Stop reason: HOSPADM

## 2023-08-10 RX ORDER — ONDANSETRON 4 MG/1
4 TABLET, ORALLY DISINTEGRATING ORAL EVERY 8 HOURS PRN
Status: DISCONTINUED | OUTPATIENT
Start: 2023-08-10 | End: 2023-08-11 | Stop reason: HOSPADM

## 2023-08-10 RX ORDER — ALBUTEROL SULFATE 90 UG/1
2 AEROSOL, METERED RESPIRATORY (INHALATION) EVERY 4 HOURS PRN
Status: DISCONTINUED | OUTPATIENT
Start: 2023-08-10 | End: 2023-08-11 | Stop reason: HOSPADM

## 2023-08-10 RX ORDER — ACETAMINOPHEN 650 MG/1
650 SUPPOSITORY RECTAL EVERY 6 HOURS PRN
Status: DISCONTINUED | OUTPATIENT
Start: 2023-08-10 | End: 2023-08-11 | Stop reason: HOSPADM

## 2023-08-10 RX ORDER — LIDOCAINE 4 G/G
1 PATCH TOPICAL DAILY
Status: DISCONTINUED | OUTPATIENT
Start: 2023-08-10 | End: 2023-08-11 | Stop reason: HOSPADM

## 2023-08-10 RX ORDER — AMLODIPINE BESYLATE 5 MG/1
5 TABLET ORAL DAILY
Status: DISCONTINUED | OUTPATIENT
Start: 2023-08-10 | End: 2023-08-11 | Stop reason: HOSPADM

## 2023-08-10 RX ORDER — ONDANSETRON 2 MG/ML
4 INJECTION INTRAMUSCULAR; INTRAVENOUS EVERY 6 HOURS PRN
Status: DISCONTINUED | OUTPATIENT
Start: 2023-08-10 | End: 2023-08-11 | Stop reason: HOSPADM

## 2023-08-10 RX ORDER — ENOXAPARIN SODIUM 100 MG/ML
30 INJECTION SUBCUTANEOUS NIGHTLY
Status: DISCONTINUED | OUTPATIENT
Start: 2023-08-10 | End: 2023-08-11 | Stop reason: HOSPADM

## 2023-08-10 RX ORDER — ACETAMINOPHEN 325 MG/1
650 TABLET ORAL EVERY 6 HOURS PRN
Status: DISCONTINUED | OUTPATIENT
Start: 2023-08-10 | End: 2023-08-11 | Stop reason: HOSPADM

## 2023-08-10 RX ORDER — SODIUM CHLORIDE 9 MG/ML
INJECTION, SOLUTION INTRAVENOUS PRN
Status: DISCONTINUED | OUTPATIENT
Start: 2023-08-10 | End: 2023-08-11 | Stop reason: HOSPADM

## 2023-08-10 RX ORDER — ATORVASTATIN CALCIUM 10 MG/1
10 TABLET, FILM COATED ORAL DAILY
Status: DISCONTINUED | OUTPATIENT
Start: 2023-08-10 | End: 2023-08-11 | Stop reason: HOSPADM

## 2023-08-10 RX ORDER — LISINOPRIL 2.5 MG/1
2.5 TABLET ORAL DAILY
Status: DISCONTINUED | OUTPATIENT
Start: 2023-08-10 | End: 2023-08-11

## 2023-08-10 RX ORDER — SODIUM CHLORIDE 0.9 % (FLUSH) 0.9 %
5-40 SYRINGE (ML) INJECTION PRN
Status: DISCONTINUED | OUTPATIENT
Start: 2023-08-10 | End: 2023-08-11 | Stop reason: HOSPADM

## 2023-08-10 RX ADMIN — CLONAZEPAM 0.5 MG: 0.5 TABLET ORAL at 19:51

## 2023-08-10 RX ADMIN — SODIUM CHLORIDE: 9 INJECTION, SOLUTION INTRAVENOUS at 21:44

## 2023-08-10 RX ADMIN — IPRATROPIUM BROMIDE AND ALBUTEROL SULFATE 1 DOSE: .5; 3 SOLUTION RESPIRATORY (INHALATION) at 19:25

## 2023-08-10 RX ADMIN — AMLODIPINE BESYLATE 5 MG: 5 TABLET ORAL at 19:51

## 2023-08-10 RX ADMIN — ENOXAPARIN SODIUM 30 MG: 100 INJECTION SUBCUTANEOUS at 19:52

## 2023-08-10 RX ADMIN — SODIUM CHLORIDE, PRESERVATIVE FREE 10 ML: 5 INJECTION INTRAVENOUS at 19:53

## 2023-08-10 RX ADMIN — Medication 5000 UNITS: at 19:51

## 2023-08-10 ASSESSMENT — ENCOUNTER SYMPTOMS
COUGH: 1
DIARRHEA: 0
SORE THROAT: 0
CONSTIPATION: 0
ABDOMINAL DISTENTION: 0
SINUS PAIN: 0
BLOOD IN STOOL: 0
WHEEZING: 0
VOMITING: 0
SHORTNESS OF BREATH: 1
TROUBLE SWALLOWING: 0
ABDOMINAL PAIN: 0
NAUSEA: 0

## 2023-08-10 NOTE — H&P
[facility] Hospitalists      Hospitalist - History & Physical      PCP: Davi Samuel MD    Date of Admission: 8/10/2023    Date of Service: 8/10/2023    Chief Complaint:  SOA/Fatigue, cough    History Of Present Illness: The patient is a 80 y.o. female with PMH of ITP, GRACIE, CHF, COPD 2 L nasal cannula at baseline, AAA, and hypertension who presented to [facility] ED on 8/10/2023 complaining of progressive shortness of breath and cough over the last month. She additionally reported that over the past week that she has not been able to eat due to severe fatigue. She states that she does take Meals on Wheels but stopped them because she was too weak to eat. She did tell the ED physician that she was concerned about metastatic disease she was told in 2017 that she had a pulmonary nodule, however, she never had a PET scan she states it was never ordered. She states her last PCP visit was approximately 6 months ago for medication refill, however, she has difficulty with transportation due to progressive vision problems. She denies fever, chills, or productive cough. She does have BROWNLEE but does feel that it has worsened. Denies chest pain, leg swelling or weight gain. She does have occasional abdominal pain, however, denies nausea or vomiting. She does report a change in her bowel habits with increased constipation and bowel output, however, felt it was due to her decreased p.o. intake and decreased activity. Further ED workup revealed CO2 34, BUN 20, creatinine 1.1. Troponin negative. BG 88. WBCs 4.1, H&H 10.1/35.4 with a platelet count of 179. Chest x-ray showed no acute radiographic abnormality, enlarged cardiac silhouette. CT of abdomen and pelvis without shows cholelithiasis without cholecystitis, no acute findings, nonobstructing 0.4 cm renal calculus bilateral simple and hemorrhagic proteinaceous renal cysts. Left lung base pulmonary nodules measuring up to 0.8 cm. Possible anemia correlate with CBC. Central airways are patent without endobronchial lesion. Right-sided volume loss is present with displacement of the cardia medial structures into the right chest. Interval development of numerous bilateral solid and ground-glass nodules measuring up to  0.7 cm left in the left lower lobe and 0.4 cm in the right lower lobe. Mild to moderate emphysema with bilateral scarring/atelectasis most prevalent in the right middle lobe and bronchiectasis. Pleural Space: No pleural effusion. No pleural thickening. No pneumothorax. Thoracic Inlet, Mediastinum, and Eleanor: Thyroid gland is normal.  No lymphadenopathy. Heart, Vessels, and Pericardium: -Ascending aorta is ectatic measuring 3.2 cm with mild atherosclerotic calcifications. -Main pulmonary artery is enlarged measuring 3.3 cm. -Heart chambers are not enlarged. -No significant valvular calcifications. -No significant coronary artery calcifications, however exam is not optimized for evaluation. -No pericardial effusion or thickening. Bones and Soft Tissues: No acute osseous abnormality. Chest wall soft tissues are within normal limits. Upper Abdomen: See CT abdomen pelvis report from the same day. Findings compatible with primary and/or metastatic disease involving both lungs. Moderate emphysema with right middle lobe atelectasis and/or scarring, unchanged. Pulmonary artery hypertension. Stable right-sided volume loss. All CT scans are performed using dose optimization techniques as appropriate to the performed exam and include at least one of the following: Automated exposure control, adjustment of the mA and/or kV according to size, and the use of iterative reconstruction technique. ______________________________________ Electronically signed by: Jil Terry M.D. Date:     08/10/2023 Time:    16:03     XR CHEST PORTABLE    Result Date: 8/10/2023  EXAM: CHEST RADIOGRAPH (1 VIEW)  TECHNIQUE: Frontal Chest Radiograph.   HISTORY: Shortness of breath

## 2023-08-11 VITALS
BODY MASS INDEX: 15.75 KG/M2 | SYSTOLIC BLOOD PRESSURE: 175 MMHG | DIASTOLIC BLOOD PRESSURE: 83 MMHG | TEMPERATURE: 97.5 F | OXYGEN SATURATION: 97 % | WEIGHT: 92.25 LBS | HEIGHT: 64 IN | RESPIRATION RATE: 20 BRPM | HEART RATE: 81 BPM

## 2023-08-11 PROBLEM — R53.1 WEAKNESS: Status: ACTIVE | Noted: 2023-08-11

## 2023-08-11 PROBLEM — Z51.5 PALLIATIVE CARE PATIENT: Status: ACTIVE | Noted: 2023-08-11

## 2023-08-11 PROBLEM — E43 SEVERE MALNUTRITION (HCC): Chronic | Status: ACTIVE | Noted: 2023-08-11

## 2023-08-11 PROBLEM — R05.2 SUBACUTE COUGH: Status: ACTIVE | Noted: 2023-08-11

## 2023-08-11 PROBLEM — R06.00 DYSPNEA: Status: ACTIVE | Noted: 2023-08-11

## 2023-08-11 LAB
ANION GAP SERPL CALCULATED.3IONS-SCNC: 9 MMOL/L (ref 7–19)
BASOPHILS # BLD: 0.1 K/UL (ref 0–0.2)
BASOPHILS NFR BLD: 1.5 % (ref 0–1)
BUN SERPL-MCNC: 23 MG/DL (ref 8–23)
CALCIUM SERPL-MCNC: 9.3 MG/DL (ref 8.2–9.6)
CHLORIDE SERPL-SCNC: 101 MMOL/L (ref 98–111)
CO2 SERPL-SCNC: 34 MMOL/L (ref 22–29)
CREAT SERPL-MCNC: 1.1 MG/DL (ref 0.5–0.9)
EOSINOPHIL # BLD: 0.1 K/UL (ref 0–0.6)
EOSINOPHIL NFR BLD: 3.3 % (ref 0–5)
ERYTHROCYTE [DISTWIDTH] IN BLOOD BY AUTOMATED COUNT: 12.6 % (ref 11.5–14.5)
FERRITIN SERPL-MCNC: 18.3 NG/ML (ref 13–150)
FOLATE SERPL-MCNC: >20 NG/ML (ref 4.8–37.3)
GLUCOSE SERPL-MCNC: 74 MG/DL (ref 74–109)
HCT VFR BLD AUTO: 35 % (ref 37–47)
HGB BLD-MCNC: 10 G/DL (ref 12–16)
HYPOCHROMIA BLD QL SMEAR: ABNORMAL
IMM GRANULOCYTES # BLD: 0 K/UL
INR PPP: 1.02 (ref 0.88–1.18)
IRON SATN MFR SERPL: 16 % (ref 14–50)
IRON SERPL-MCNC: 59 UG/DL (ref 37–145)
LV EF: 65 %
LVEF MODALITY: NORMAL
LYMPHOCYTES # BLD: 0.8 K/UL (ref 1.1–4.5)
LYMPHOCYTES NFR BLD: 19.4 % (ref 20–40)
MCH RBC QN AUTO: 26.5 PG (ref 27–31)
MCHC RBC AUTO-ENTMCNC: 28.6 G/DL (ref 33–37)
MCV RBC AUTO: 92.6 FL (ref 81–99)
MONOCYTES # BLD: 0.6 K/UL (ref 0–0.9)
MONOCYTES NFR BLD: 15.4 % (ref 0–10)
NEUTROPHILS # BLD: 2.4 K/UL (ref 1.5–7.5)
NEUTS SEG NFR BLD: 60.1 % (ref 50–65)
PHOSPHATE SERPL-MCNC: 3 MG/DL (ref 2.5–4.5)
PLATELET # BLD AUTO: 246 K/UL (ref 130–400)
PMV BLD AUTO: 9.4 FL (ref 9.4–12.3)
POTASSIUM SERPL-SCNC: 4.2 MMOL/L (ref 3.5–5)
PROTHROMBIN TIME: 13.1 SEC (ref 12–14.6)
RBC # BLD AUTO: 3.78 M/UL (ref 4.2–5.4)
RETICS # AUTO: 0.05 M/UL (ref 0.03–0.12)
RETICS/RBC NFR: 1.35 % (ref 0.5–1.5)
SODIUM SERPL-SCNC: 144 MMOL/L (ref 136–145)
TIBC SERPL-MCNC: 373 UG/DL (ref 250–400)
TSH SERPL DL<=0.005 MIU/L-ACNC: 1.98 UIU/ML (ref 0.35–5.5)
VIT B12 SERPL-MCNC: 522 PG/ML (ref 211–946)
WBC # BLD AUTO: 4 K/UL (ref 4.8–10.8)

## 2023-08-11 PROCEDURE — 82728 ASSAY OF FERRITIN: CPT

## 2023-08-11 PROCEDURE — 6370000000 HC RX 637 (ALT 250 FOR IP): Performed by: NURSE PRACTITIONER

## 2023-08-11 PROCEDURE — 97161 PT EVAL LOW COMPLEX 20 MIN: CPT

## 2023-08-11 PROCEDURE — 83550 IRON BINDING TEST: CPT

## 2023-08-11 PROCEDURE — 97165 OT EVAL LOW COMPLEX 30 MIN: CPT

## 2023-08-11 PROCEDURE — 6360000002 HC RX W HCPCS: Performed by: INTERNAL MEDICINE

## 2023-08-11 PROCEDURE — 84100 ASSAY OF PHOSPHORUS: CPT

## 2023-08-11 PROCEDURE — 2700000000 HC OXYGEN THERAPY PER DAY

## 2023-08-11 PROCEDURE — 82607 VITAMIN B-12: CPT

## 2023-08-11 PROCEDURE — 99222 1ST HOSP IP/OBS MODERATE 55: CPT | Performed by: INTERNAL MEDICINE

## 2023-08-11 PROCEDURE — 99222 1ST HOSP IP/OBS MODERATE 55: CPT | Performed by: PHYSICIAN ASSISTANT

## 2023-08-11 PROCEDURE — 88185 FLOWCYTOMETRY/TC ADD-ON: CPT

## 2023-08-11 PROCEDURE — 84443 ASSAY THYROID STIM HORMONE: CPT

## 2023-08-11 PROCEDURE — 36415 COLL VENOUS BLD VENIPUNCTURE: CPT

## 2023-08-11 PROCEDURE — 2580000003 HC RX 258: Performed by: NURSE PRACTITIONER

## 2023-08-11 PROCEDURE — 83540 ASSAY OF IRON: CPT

## 2023-08-11 PROCEDURE — 88184 FLOWCYTOMETRY/ TC 1 MARKER: CPT

## 2023-08-11 PROCEDURE — 82746 ASSAY OF FOLIC ACID SERUM: CPT

## 2023-08-11 PROCEDURE — C8929 TTE W OR WO FOL WCON,DOPPLER: HCPCS

## 2023-08-11 PROCEDURE — 80048 BASIC METABOLIC PNL TOTAL CA: CPT

## 2023-08-11 PROCEDURE — 6360000004 HC RX CONTRAST MEDICATION: Performed by: NURSE PRACTITIONER

## 2023-08-11 PROCEDURE — 97535 SELF CARE MNGMENT TRAINING: CPT

## 2023-08-11 PROCEDURE — 85610 PROTHROMBIN TIME: CPT

## 2023-08-11 PROCEDURE — 94640 AIRWAY INHALATION TREATMENT: CPT

## 2023-08-11 PROCEDURE — 94760 N-INVAS EAR/PLS OXIMETRY 1: CPT

## 2023-08-11 PROCEDURE — 85045 AUTOMATED RETICULOCYTE COUNT: CPT

## 2023-08-11 PROCEDURE — 97110 THERAPEUTIC EXERCISES: CPT

## 2023-08-11 PROCEDURE — 85025 COMPLETE CBC W/AUTO DIFF WBC: CPT

## 2023-08-11 RX ORDER — IPRATROPIUM BROMIDE AND ALBUTEROL SULFATE 2.5; .5 MG/3ML; MG/3ML
3 SOLUTION RESPIRATORY (INHALATION)
Qty: 360 ML | Refills: 0 | Status: SHIPPED | OUTPATIENT
Start: 2023-08-11

## 2023-08-11 RX ORDER — DOCUSATE SODIUM 100 MG/1
100 CAPSULE, LIQUID FILLED ORAL 2 TIMES DAILY
Qty: 60 CAPSULE | Refills: 0 | Status: SHIPPED | OUTPATIENT
Start: 2023-08-11 | End: 2023-09-10

## 2023-08-11 RX ORDER — DOCUSATE SODIUM 100 MG/1
100 CAPSULE, LIQUID FILLED ORAL 2 TIMES DAILY
Qty: 60 CAPSULE | Refills: 0 | Status: SHIPPED | OUTPATIENT
Start: 2023-08-11 | End: 2023-08-11 | Stop reason: SDUPTHER

## 2023-08-11 RX ORDER — LANOLIN ALCOHOL/MO/W.PET/CERES
325 CREAM (GRAM) TOPICAL 2 TIMES DAILY
Qty: 60 TABLET | Refills: 0 | Status: SHIPPED | OUTPATIENT
Start: 2023-08-11

## 2023-08-11 RX ORDER — LISINOPRIL 2.5 MG/1
2.5 TABLET ORAL DAILY
Status: DISCONTINUED | OUTPATIENT
Start: 2023-08-11 | End: 2023-08-11 | Stop reason: HOSPADM

## 2023-08-11 RX ORDER — LANOLIN ALCOHOL/MO/W.PET/CERES
325 CREAM (GRAM) TOPICAL 2 TIMES DAILY
Qty: 60 TABLET | Refills: 0 | Status: SHIPPED | OUTPATIENT
Start: 2023-08-11 | End: 2023-08-11 | Stop reason: SDUPTHER

## 2023-08-11 RX ADMIN — IRON SUCROSE 200 MG: 20 INJECTION, SOLUTION INTRAVENOUS at 10:54

## 2023-08-11 RX ADMIN — CLONAZEPAM 0.5 MG: 0.5 TABLET ORAL at 10:55

## 2023-08-11 RX ADMIN — SODIUM CHLORIDE, PRESERVATIVE FREE 10 ML: 5 INJECTION INTRAVENOUS at 10:55

## 2023-08-11 RX ADMIN — IPRATROPIUM BROMIDE AND ALBUTEROL SULFATE 1 DOSE: .5; 3 SOLUTION RESPIRATORY (INHALATION) at 06:39

## 2023-08-11 RX ADMIN — PERFLUTREN 1.5 ML: 6.52 INJECTION, SUSPENSION INTRAVENOUS at 14:25

## 2023-08-11 RX ADMIN — Medication 5000 UNITS: at 10:55

## 2023-08-11 RX ADMIN — ALBUTEROL SULFATE 2 PUFF: 90 AEROSOL, METERED RESPIRATORY (INHALATION) at 02:25

## 2023-08-11 RX ADMIN — AMLODIPINE BESYLATE 5 MG: 5 TABLET ORAL at 10:55

## 2023-08-11 NOTE — ACP (ADVANCE CARE PLANNING)
Advance Care Planning     Advance Care Planning (ACP) Physician/NP/PA (Provider) Conversation      Date of ACP Conversation: 08/11/2023    Conversation Conducted with:   Patient with Decision Making [id number] Decision Maker:    Current Designated Health Care Decision Maker:    Primary Decision Maker: Chin Escalante/[facility] - [phone]    Care Preferences:    Ventilation:  No    Resuscitation:  No    Length of Voluntary ACP Conversation in minutes:  15 minutes included w/ total consult time     Basim Paulino PA-C

## 2023-08-11 NOTE — CARE COORDINATION
Case Management Assessment  Initial Evaluation    Date/Time of Evaluation: 8/11/2023 9:45 AM  Assessment Completed by: CAMERON Valdes    If patient is discharged prior to next notation, then this note serves as note for discharge by case management. Patient Name: Irene Samayoa                   YOB: 1932  Diagnosis: Abnormal chest CT [R93.89]  Subacute cough [R05.2]                   Date / Time: 8/10/2023  1:43 PM    Patient Admission Status: Inpatient   Readmission Risk (Low < 19, Mod (19-27), High > 27): Readmission Risk Score: 13.8    Current PCP: Jenn Mcneill MD  PCP verified by CM? (P) Yes    Chart Reviewed: Yes      History Provided by: (P) Patient  Patient Orientation: (P) Alert and Oriented, Person, Place, Situation, Self    Patient Cognition: (P) Alert    Hospitalization in the last 30 days (Readmission):  No    If yes, Readmission Assessment in  Navigator will be completed.     Advance Directives:      Code Status: DNR   Patient's Primary Decision Maker is: (P) Legal Next of Kin      Discharge Planning:    Patient lives with: (P) Alone Type of Home: (P) Apartment  Primary Care Giver: (P) Self  Patient Support Systems include: (P) Children   Current Financial resources: (P) Medicare, Medicaid  Current community resources: (P) None  Current services prior to admission: (P) None            Current DME:              Type of Home Care services:  (P) None    ADLS  Prior functional level: (P) Assistance with the following:, Bathing, Housework, Mobility  Current functional level: (P) Mobility, Housework, Bathing, Assistance with the following:    PT AM-PAC:   /24  OT AM-PAC:   /24    Family can provide assistance at DC: (P) Yes  Would you like Case Management to discuss the discharge plan with any other family members/significant others, and if so, who? (P) Yes  Plans to Return to Present Housing: (P) Yes  Other Identified Issues/Barriers to RETURNING to current housing: limited

## 2023-08-11 NOTE — PROGRESS NOTES
Physical Therapy  Facility/Department: [facility] ONCOLOGY UNIT  Physical Therapy Initial Assessment    Name: Derick Peña  : 1932  MRN: [id number]  Date of Service: 2023    Discharge Recommendations:  Continue to assess pending progress, 24 hour supervision or assist, Patient would benefit from continued therapy after discharge          Patient Diagnosis(es): The primary encounter diagnosis was Subacute cough. A diagnosis of Chronic obstructive pulmonary disease, unspecified COPD type ([address]) was also pertinent to this visit. Past Medical History:  has a past medical history of Abdominal aortic aneurysm (AAA) ([address]), Anemia, Anxiety, Arthritis, CHF (congestive heart failure) ([address]), COPD (chronic obstructive pulmonary disease) ([address]), Depression, H/O idiopathic thrombocytopenic purpura, Hypertension, and Migraines. Past Surgical History:  has a past surgical history that includes Total abdominal hysterectomy w/ bilateral salpingoophorectomy; bone marrow biopsy; and Colonoscopy (2013). Assessment   Body Structures, Functions, Activity Limitations Requiring Skilled Therapeutic Intervention: Decreased functional mobility ; Decreased endurance  Assessment: Pt. will benefit from cont. PT to decrease impairments. Pt. a fall risk and should not attempt mobility on her own. Pt. needs to use RW, gait belt and staff A. Encouraged to sit up in the chair. Would be able to d/c home with family A.   Treatment Diagnosis: impaired gait and mobility  Therapy Prognosis: Good  Decision Making: Low Complexity  Barriers to Learning: none noted  Requires PT Follow-Up: Yes  Activity Tolerance  Activity Tolerance: Patient tolerated treatment well;Patient limited by endurance     Plan   Physcial Therapy Plan  General Plan: 6-7 times per week  Therapy Duration: 2 Weeks  Current Treatment Recommendations: Balance training, Functional mobility training, Transfer training, Safety education & training, Patient/Caregiver education & Nasal cannula  Comment: 1.5L     Observation/Palpation  Posture: Good  Observation: IV leaking (RN removed IV)        AROM RLE (degrees)  RLE AROM: WFL  AROM LLE (degrees)  LLE AROM : WFL  Strength RLE  Strength RLE: WFL  Strength LLE  Strength LLE: WFL           Bed mobility  Supine to Sit: Independent  Sit to Supine: Unable to assess  Bed Mobility Comments: up to chair  Transfers  Sit to Stand: Contact guard assistance  Stand to Sit: Contact guard assistance  Ambulation  Surface: Level tile  Device: Rolling Walker  Other Apparatus: O2  Assistance: Contact guard assistance  Quality of Gait: steady with RW  Gait Deviations: Slow Maxine  Distance: 120'  Comments: pt felt SOA, but did not seem to have labored breathing. took 1 standing rest break.      Balance  Posture: Good  Sitting - Static: Good;+  Sitting - Dynamic: Good;-  Standing - Static: Fair;+  Standing - Dynamic: Fair;-           OutComes Score                                                  AM-PAC Score             Tinneti Score       Goals  Short Term Goals  Time Frame for Short Term Goals: 2 wks  Short Term Goal 1: sit to stand indep  Short Term Goal 2: amb. 200' with RW SBA  Patient Goals   Patient Goals : go home       Education  Patient Education  Education Given To: Patient  Education Provided: Role of Therapy;Plan of Care;Transfer Training  Education Provided Comments: use of call light, staff A  Education Method: Demonstration;Verbal  Barriers to Learning: None  Education Outcome: Verbalized understanding;Demonstrated understanding;Continued education needed      Therapy Time   Individual Concurrent Group Co-treatment   Time In           Time Out           Minutes                   Shabnam Shankar PT     Electronically signed by Shabnam Shankar PT on 8/11/2023 at 2:03 PM

## 2023-08-11 NOTE — PROGRESS NOTES
Comprehensive Nutrition Assessment    Type and Reason for Visit:  Initial, Positive Nutrition Screen    Nutrition Recommendations/Plan:   Modify ONS orders to TID     Malnutrition Assessment:  Malnutrition Status:  Severe malnutrition (08/11/23 1039)    Context:  Chronic Illness     Findings of the 6 clinical characteristics of malnutrition:    Body Fat Loss:  Severe body fat loss Buccal region, Triceps   Muscle Mass Loss:  Severe muscle mass loss Clavicles (pectoralis & deltoids), Hand (interosseous)    Nutrition Assessment:    Pt is Severely Malnourished AEB severe fat and muscle loss. Pt currently receives an ONS BID. Will modify orders to TID to help meet nutritional needs. Current PO intake of meals is good. Current Nutrition Intake & Therapies:    Average Meal Intake: %  ADULT DIET; Easy to Chew; Low Fat/Low Chol/High Fiber/JENNA  ADULT ORAL NUTRITION SUPPLEMENT; Breakfast, Lunch; Standard High Calorie/High Protein Oral Supplement    Anthropometric Measures:  Height: 5' 4\" (162.6 cm)  Ideal Body Weight (IBW): 120 lbs (55 kg)    Current Body Weight: 92 lb 4 oz (41.8 kg), 76.9 % IBW.     Current BMI (kg/m2): 15.8  BMI Categories: Underweight (BMI less than 22) age over 72    Estimated Daily Nutrient Needs:  Energy Requirements Based On: Kcal/kg  Weight Used for Energy Requirements: Current  Energy (kcal/day): 2057-8363 kcals/day  Weight Used for Protein Requirements: Current  Protein (g/day): 63-84 g/protein/day  Method Used for Fluid Requirements: 1 ml/kcal  Fluid (ml/day): 8949-7109 mL/day    Nutrition Diagnosis:   Severe malnutrition, In context of chronic illness related to inadequate protein-energy intake as evidenced by severe muscle loss, severe loss of subcutaneous fat    Nutrition Interventions:   Food and/or Nutrient Delivery: Continue Current Diet, Modify Oral Nutrition Supplement  Coordination of Nutrition Care: Continue to monitor while inpatient    Goals:  Goals: PO intake 75% or greater,

## 2023-08-11 NOTE — CARE COORDINATION
Spoke with patient regarding MD orders for [facility] services. Patient agreeable and has chosen [facility]. Referral Faxed. [address] [facility] [phone]. [phone]. Please notify [phone] [facility] when patient discharges and fax DC Summary,  DC med list and any new [facility] orders. The Patient and/or patient representative was provided with a choice of provider and agrees   with the discharge plan. [x] Yes [] No    Freedom of choice list was provided with basic dialogue that supports the patient's individualized plan of care/goals, treatment preferences and shares the quality data associated with the providers.  [x] Yes [] No  Electronically signed by Mary Ann Zacarias on 8/11/2023 at 11:34 AM

## 2023-08-11 NOTE — DISCHARGE SUMMARY
CONTINUE taking these medications      albuterol sulfate  (90 Base) MCG/ACT inhaler  Commonly known as: PROVENTIL;VENTOLIN;PROAIR     amLODIPine 5 MG tablet  Commonly known as: NORVASC     carboxymethylcellulose 0.5 % Soln ophthalmic solution  Commonly known as: REFRESH PLUS     clonazePAM 0.5 MG tablet  Commonly known as: KLONOPIN     lisinopril 2.5 MG tablet  Commonly known as: PRINIVIL;ZESTRIL     simvastatin 20 MG tablet  Commonly known as: ZOCOR     Spiriva HandiHaler 18 MCG inhalation capsule  Generic drug: tiotropium     Vitamin D3 125 MCG (5000 UT) Tabs            STOP taking these medications      naproxen 500 MG tablet  Commonly known as: NAPROSYN               Where to Get Your Medications        These medications were sent to [address] [place], [place] [address] [phone] - F [phone]  [address] [facility]. [place] [address]      Phone: [phone]   ipratropium 0.5 mg-albuterol 2.5 mg 0.5-2.5 (3) MG/3ML Soln nebulizer solution       These medications were sent to [address] [facility], [address] [phone] [facility] [phone]  [address] President Keith Egan      Phone: [phone]   docusate sodium 100 MG capsule  ferrous sulfate 325 (65 Fe) MG EC tablet         Discharge Instructions: Follow up with Oanh Campoverde MD as scheduled within 1 week of discharge for hospital follow-up  Follow-up with Dr. Maverick Chong as recommended    Take medications as directed. Resume activity as tolerated. Diet: ADULT DIET; Easy to Chew; Low Fat/Low Chol/High Fiber/JENNA  ADULT ORAL NUTRITION SUPPLEMENT; Breakfast, Lunch, Dinner; Standard High Calorie/High Protein Oral Supplement     Disposition: Patient is Stableand will be discharged to Home with [facility].     Time spent on discharge 41 minutes spent in assessing patient, reviewing medications, discussion with nursing, confirming safe discharge plan and preparation of

## 2023-08-11 NOTE — CARE COORDINATION
L  Relationship to Patient: Self  Signed ABN: N    Payor Name:  George Amos   Plan:  [employer]HE[employer] MEDIBLUE DUAL ADVANTAGE   Group: [id number]        Payor Name: MEDICAID [place]  Plan:  Lisette Rae     Worker's Comp Date of Injury:               CSN                                    Req/Control # [Problem retrieving Specimen ID]                                   Order Date:  Aug 11, 2023  [id number]                                          Patient Information      Name:  Ambreen Jaime  :  1932  Age:  80 y.o. Address:  [address] [place] [address], [address] [place] [address]   Zip:  [address]  PCP: Heron Gaucher, MD Sex:  F  SSN: [id number]  Home Phone: [phone]  Work Phone:    Patient MRN:  [id number]    Alt Patient ID:  [id number]  PCP Phone: [phone]       Authorizing Provider Information       AUTHORIZING PROVIDER: ROSA ELENA Castro  Physician ID: [id number]  NPI:  [id number]  Site:   Address: [address] [place] [id number]  [address] [place] [address], [address] [place] [address]  Phone: [phone]  Fax: [phone]             EQUIPMENT ORDERED  DME Order for Bedside Commode as OP [DME04] (ORD   #:   [id number]) Priority  Routine Class  Hospital Performed        Associated Diagnosis:          Comments: You must complete the order parameters below and add the medical necessity documentation for this DME in a separate note.      Commode Chair with Fixed Arms     Current patient weight: Weight - Scale: 92 lb 4 oz (41.8 kg)  Current patient height: Height: 5' 4\" (162.6 cm)  Diagnosis: Generalized weakness, COPD  Duration: Purchase            Scheduling Instructions:                                 Specimen Source             Collection Date    Collection Time    Order Status    Expected Date                 Electronically Signed By  ROSA ELENA Castro  NPI:  [id number] Date  Aug 11, 2023  11:16 AM              Responsible Party Arthur Segura-ActID   Relationship Account Type Inlet, Mediastinum, and Eleanor: Thyroid gland is normal.  No lymphadenopathy. Heart, Vessels, and Pericardium: -Ascending aorta is ectatic measuring 3.2 cm with mild atherosclerotic calcifications. -Main pulmonary artery is enlarged measuring 3.3 cm. -Heart chambers are not enlarged. -No significant valvular calcifications. -No significant coronary artery calcifications, however exam is not optimized for evaluation. -No pericardial effusion or thickening. Bones and Soft Tissues: No acute osseous abnormality. Chest wall soft tissues are within normal limits. Upper Abdomen: See CT abdomen pelvis report from the same day. Findings compatible with primary and/or metastatic disease involving both lungs. Moderate emphysema with right middle lobe atelectasis and/or scarring, unchanged. Pulmonary artery hypertension. Stable right-sided volume loss. All CT scans are performed using dose optimization techniques as appropriate to the performed exam and include at least one of the following: Automated exposure control, adjustment of the mA and/or kV according to size, and the use of iterative reconstruction technique. ______________________________________ Electronically signed by: Miki Little M.D. Date:     08/10/2023 Time:    16:03      XR CHEST PORTABLE     Result Date: 8/10/2023  EXAM: CHEST RADIOGRAPH (1 VIEW)  TECHNIQUE: Frontal Chest Radiograph. HISTORY: Shortness of breath  COMPARISON: 02/22/2018. FINDINGS:  Lines, Tubes, Devices:  None  Lungs and Pleura:  No focal consolidation. No pleural effusion. No pneumothorax. Right lung volume loss, stable. Emphysema. Cardiac silhouette: Enlarged. Bones: No acute abnormality. No acute radiographic abnormality. Enlarged cardiac silhouette. Right lung volume loss, stable.    ______________________________________ Electronically signed by: Peg MALDONADO  Date:     08/10/2023 Time:    15:01         Assessment/Plan:  Principal Problem:

## 2023-08-11 NOTE — PROGRESS NOTES
Occupational Therapy Initial Assessment  Date: 2023   Patient Name: Yokasta Davenport  MRN: [id number]     : 1932    Date of Service: 2023    Discharge Recommendations:  24 hour supervision or assist  OT Equipment Recommendations  Equipment Needed: No    Assessment   Assessment: Evaluation completed and tx initiated. The patient would benefit from supervision for standing level and ambulatory level ADL. Treatment Diagnosis: General weakness, Pulmonary nodules concerning for metastatic process  History: hx of ITP, hx of IVIG, hx of CHF, COPD, AAA  REQUIRES OT FOLLOW-UP: Yes  Activity Tolerance  Activity Tolerance: Patient Tolerated treatment well           Patient Diagnosis(es): The primary encounter diagnosis was Subacute cough. A diagnosis of Chronic obstructive pulmonary disease, unspecified COPD type ([address]) was also pertinent to this visit. has a past medical history of Abdominal aortic aneurysm (AAA) ([address]), Anemia, Anxiety, Arthritis, CHF (congestive heart failure) ([address]), COPD (chronic obstructive pulmonary disease) ([address]), Depression, H/O idiopathic thrombocytopenic purpura, Hypertension, and Migraines. has a past surgical history that includes Total abdominal hysterectomy w/ bilateral salpingoophorectomy; bone marrow biopsy; and Colonoscopy (2013).     Treatment Diagnosis: General weakness, Pulmonary nodules concerning for metastatic process      Restrictions  Restrictions/Precautions  Restrictions/Precautions: Fall Risk    Subjective   General  Chart Reviewed: Yes  Patient assessed for rehabilitation services?: Yes  Family / Caregiver Present: No  Pre Treatment Pain Screening  Pain at present: 0  Scale Used: Numeric Score  Intervention List: Patient able to continue with treatment  Comments / Details: no pain with activity  Vital Signs  Pulse: 81    Social/Functional History  Social/Functional History  Lives With: Spouse  Type of Home: House  Home Layout: One level  Home Access:

## 2023-08-12 LAB
ACUTE LEUKEMIA MARKERS SPEC-IMP: NORMAL
EVENTS COUNTED SPEC: 26 MARKERS
PROF LEUK/LYMPH PHENO 16 OR MORE MARKERS: NORMAL
PROF LEUK/LYMPH PHENO 26 MARKERS: NORMAL
SOURCE: NORMAL

## 2023-08-15 ENCOUNTER — APPOINTMENT (OUTPATIENT)
Dept: GENERAL RADIOLOGY | Age: 88
End: 2023-08-15
Attending: STUDENT IN AN ORGANIZED HEALTH CARE EDUCATION/TRAINING PROGRAM
Payer: MEDICARE

## 2023-08-15 ENCOUNTER — HOSPITAL ENCOUNTER (INPATIENT)
Age: 88
LOS: 2 days | Discharge: HOME HEALTH | End: 2023-08-17
Attending: STUDENT IN AN ORGANIZED HEALTH CARE EDUCATION/TRAINING PROGRAM
Payer: MEDICARE

## 2023-08-15 DIAGNOSIS — I48.91 ATRIAL FIBRILLATION WITH RAPID VENTRICULAR RESPONSE (HCC): Primary | ICD-10-CM

## 2023-08-15 DIAGNOSIS — R06.02 SHORTNESS OF BREATH: ICD-10-CM

## 2023-08-15 DIAGNOSIS — J44.1 COPD EXACERBATION (HCC): ICD-10-CM

## 2023-08-15 DIAGNOSIS — R05.3 CHRONIC COUGH: ICD-10-CM

## 2023-08-15 PROBLEM — R79.89 ELEVATED TROPONIN: Status: ACTIVE | Noted: 2023-08-15

## 2023-08-15 PROBLEM — R77.8 ELEVATED TROPONIN: Status: ACTIVE | Noted: 2023-08-15

## 2023-08-15 LAB
ALBUMIN SERPL-MCNC: 4 G/DL (ref 3.5–5.2)
ALP SERPL-CCNC: 64 U/L (ref 35–104)
ALT SERPL-CCNC: 8 U/L (ref 5–33)
ANION GAP SERPL CALCULATED.3IONS-SCNC: 9 MMOL/L (ref 7–19)
AST SERPL-CCNC: 20 U/L (ref 5–32)
B PARAP IS1001 DNA NPH QL NAA+NON-PROBE: NOT DETECTED
B PERT.PT PRMT NPH QL NAA+NON-PROBE: NOT DETECTED
BASOPHILS # BLD: 0.1 K/UL (ref 0–0.2)
BASOPHILS NFR BLD: 1.4 % (ref 0–1)
BILIRUB SERPL-MCNC: <0.2 MG/DL (ref 0.2–1.2)
BNP BLD-MCNC: 1035 PG/ML (ref 0–449)
BUN SERPL-MCNC: 22 MG/DL (ref 8–23)
C PNEUM DNA NPH QL NAA+NON-PROBE: NOT DETECTED
CALCIUM SERPL-MCNC: 9.3 MG/DL (ref 8.2–9.6)
CHLORIDE SERPL-SCNC: 98 MMOL/L (ref 98–111)
CO2 SERPL-SCNC: 34 MMOL/L (ref 22–29)
CREAT SERPL-MCNC: 1.1 MG/DL (ref 0.5–0.9)
EKG P AXIS: 71 DEGREES
EKG P-R INTERVAL: 148 MS
EKG Q-T INTERVAL: 354 MS
EKG QRS DURATION: 74 MS
EKG QTC CALCULATION (BAZETT): 397 MS
EKG T AXIS: 76 DEGREES
EOSINOPHIL # BLD: 0.1 K/UL (ref 0–0.6)
EOSINOPHIL NFR BLD: 1.5 % (ref 0–5)
ERYTHROCYTE [DISTWIDTH] IN BLOOD BY AUTOMATED COUNT: 12.6 % (ref 11.5–14.5)
FLUAV RNA NPH QL NAA+NON-PROBE: NOT DETECTED
FLUBV RNA NPH QL NAA+NON-PROBE: NOT DETECTED
GLUCOSE SERPL-MCNC: 115 MG/DL (ref 74–109)
HADV DNA NPH QL NAA+NON-PROBE: NOT DETECTED
HCOV 229E RNA NPH QL NAA+NON-PROBE: NOT DETECTED
HCOV HKU1 RNA NPH QL NAA+NON-PROBE: NOT DETECTED
HCOV NL63 RNA NPH QL NAA+NON-PROBE: NOT DETECTED
HCOV OC43 RNA NPH QL NAA+NON-PROBE: NOT DETECTED
HCT VFR BLD AUTO: 35.6 % (ref 37–47)
HGB BLD-MCNC: 9.9 G/DL (ref 12–16)
HMPV RNA NPH QL NAA+NON-PROBE: NOT DETECTED
HPIV1 RNA NPH QL NAA+NON-PROBE: NOT DETECTED
HPIV2 RNA NPH QL NAA+NON-PROBE: NOT DETECTED
HPIV3 RNA NPH QL NAA+NON-PROBE: NOT DETECTED
HPIV4 RNA NPH QL NAA+NON-PROBE: NOT DETECTED
HYPOCHROMIA BLD QL SMEAR: ABNORMAL
IMM GRANULOCYTES # BLD: 0 K/UL
LACTATE BLDV-SCNC: 1.7 MMOL/L (ref 0.5–1.9)
LYMPHOCYTES # BLD: 0.8 K/UL (ref 1.1–4.5)
LYMPHOCYTES NFR BLD: 11.6 % (ref 20–40)
M PNEUMO DNA NPH QL NAA+NON-PROBE: NOT DETECTED
MCH RBC QN AUTO: 26 PG (ref 27–31)
MCHC RBC AUTO-ENTMCNC: 27.8 G/DL (ref 33–37)
MCV RBC AUTO: 93.4 FL (ref 81–99)
MONOCYTES # BLD: 0.7 K/UL (ref 0–0.9)
MONOCYTES NFR BLD: 10.8 % (ref 0–10)
NEUTROPHILS # BLD: 4.9 K/UL (ref 1.5–7.5)
NEUTS SEG NFR BLD: 74.2 % (ref 50–65)
OVALOCYTES BLD QL SMEAR: ABNORMAL
PLATELET # BLD AUTO: 206 K/UL (ref 130–400)
PLATELET SLIDE REVIEW: ADEQUATE
PMV BLD AUTO: 9.6 FL (ref 9.4–12.3)
POTASSIUM SERPL-SCNC: 5.3 MMOL/L (ref 3.5–5)
PROCALCITONIN: 0.19 NG/ML (ref 0–0.09)
PROT SERPL-MCNC: 6.2 G/DL (ref 6.6–8.7)
RBC # BLD AUTO: 3.81 M/UL (ref 4.2–5.4)
RSV RNA NPH QL NAA+NON-PROBE: NOT DETECTED
RV+EV RNA NPH QL NAA+NON-PROBE: NOT DETECTED
SARS-COV-2 RNA NPH QL NAA+NON-PROBE: NOT DETECTED
SODIUM SERPL-SCNC: 141 MMOL/L (ref 136–145)
TROPONIN T SERPL-MCNC: 0.02 NG/ML (ref 0–0.03)
TROPONIN T SERPL-MCNC: 0.05 NG/ML (ref 0–0.03)
WBC # BLD AUTO: 6.6 K/UL (ref 4.8–10.8)

## 2023-08-15 PROCEDURE — 83605 ASSAY OF LACTIC ACID: CPT

## 2023-08-15 PROCEDURE — 84145 PROCALCITONIN (PCT): CPT

## 2023-08-15 PROCEDURE — 80053 COMPREHEN METABOLIC PANEL: CPT

## 2023-08-15 PROCEDURE — 93005 ELECTROCARDIOGRAM TRACING: CPT | Performed by: STUDENT IN AN ORGANIZED HEALTH CARE EDUCATION/TRAINING PROGRAM

## 2023-08-15 PROCEDURE — 84484 ASSAY OF TROPONIN QUANT: CPT

## 2023-08-15 PROCEDURE — 6370000000 HC RX 637 (ALT 250 FOR IP): Performed by: HOSPITALIST

## 2023-08-15 PROCEDURE — 6360000002 HC RX W HCPCS: Performed by: HOSPITALIST

## 2023-08-15 PROCEDURE — 0202U NFCT DS 22 TRGT SARS-COV-2: CPT

## 2023-08-15 PROCEDURE — 1210000000 HC MED SURG R&B

## 2023-08-15 PROCEDURE — 96361 HYDRATE IV INFUSION ADD-ON: CPT

## 2023-08-15 PROCEDURE — 2580000003 HC RX 258: Performed by: HOSPITALIST

## 2023-08-15 PROCEDURE — 6360000002 HC RX W HCPCS: Performed by: STUDENT IN AN ORGANIZED HEALTH CARE EDUCATION/TRAINING PROGRAM

## 2023-08-15 PROCEDURE — 71045 X-RAY EXAM CHEST 1 VIEW: CPT

## 2023-08-15 PROCEDURE — 85025 COMPLETE CBC W/AUTO DIFF WBC: CPT

## 2023-08-15 PROCEDURE — 36415 COLL VENOUS BLD VENIPUNCTURE: CPT

## 2023-08-15 PROCEDURE — 83880 ASSAY OF NATRIURETIC PEPTIDE: CPT

## 2023-08-15 PROCEDURE — 96375 TX/PRO/DX INJ NEW DRUG ADDON: CPT

## 2023-08-15 PROCEDURE — 2580000003 HC RX 258: Performed by: STUDENT IN AN ORGANIZED HEALTH CARE EDUCATION/TRAINING PROGRAM

## 2023-08-15 PROCEDURE — 96365 THER/PROPH/DIAG IV INF INIT: CPT

## 2023-08-15 PROCEDURE — 94640 AIRWAY INHALATION TREATMENT: CPT

## 2023-08-15 PROCEDURE — 6370000000 HC RX 637 (ALT 250 FOR IP): Performed by: STUDENT IN AN ORGANIZED HEALTH CARE EDUCATION/TRAINING PROGRAM

## 2023-08-15 PROCEDURE — 6370000000 HC RX 637 (ALT 250 FOR IP): Performed by: NURSE PRACTITIONER

## 2023-08-15 PROCEDURE — 99285 EMERGENCY DEPT VISIT HI MDM: CPT

## 2023-08-15 PROCEDURE — 2700000000 HC OXYGEN THERAPY PER DAY

## 2023-08-15 RX ORDER — SODIUM POLYSTYRENE SULFONATE 15 G/60ML
15 SUSPENSION ORAL; RECTAL ONCE
Status: COMPLETED | OUTPATIENT
Start: 2023-08-15 | End: 2023-08-15

## 2023-08-15 RX ORDER — ARFORMOTEROL TARTRATE 15 UG/2ML
15 SOLUTION RESPIRATORY (INHALATION)
Status: DISCONTINUED | OUTPATIENT
Start: 2023-08-15 | End: 2023-08-17 | Stop reason: HOSPADM

## 2023-08-15 RX ORDER — ACETAMINOPHEN 500 MG
500 TABLET ORAL EVERY 4 HOURS PRN
Status: DISCONTINUED | OUTPATIENT
Start: 2023-08-15 | End: 2023-08-17 | Stop reason: HOSPADM

## 2023-08-15 RX ORDER — PANTOPRAZOLE SODIUM 40 MG/1
40 TABLET, DELAYED RELEASE ORAL
Status: DISCONTINUED | OUTPATIENT
Start: 2023-08-16 | End: 2023-08-17 | Stop reason: HOSPADM

## 2023-08-15 RX ORDER — IPRATROPIUM BROMIDE AND ALBUTEROL SULFATE 2.5; .5 MG/3ML; MG/3ML
1 SOLUTION RESPIRATORY (INHALATION) ONCE
Status: COMPLETED | OUTPATIENT
Start: 2023-08-15 | End: 2023-08-15

## 2023-08-15 RX ORDER — ASPIRIN 325 MG
325 TABLET ORAL ONCE
Status: COMPLETED | OUTPATIENT
Start: 2023-08-15 | End: 2023-08-15

## 2023-08-15 RX ORDER — CARBOXYMETHYLCELLULOSE SODIUM 5 MG/ML
2 SOLUTION/ DROPS OPHTHALMIC EVERY 4 HOURS PRN
Status: DISCONTINUED | OUTPATIENT
Start: 2023-08-15 | End: 2023-08-17 | Stop reason: HOSPADM

## 2023-08-15 RX ORDER — SODIUM CHLORIDE 0.9 % (FLUSH) 0.9 %
5-40 SYRINGE (ML) INJECTION PRN
Status: DISCONTINUED | OUTPATIENT
Start: 2023-08-15 | End: 2023-08-17 | Stop reason: HOSPADM

## 2023-08-15 RX ORDER — MECOBALAMIN 5000 MCG
10 TABLET,DISINTEGRATING ORAL NIGHTLY
Status: DISCONTINUED | OUTPATIENT
Start: 2023-08-15 | End: 2023-08-17 | Stop reason: HOSPADM

## 2023-08-15 RX ORDER — HEPARIN SODIUM 5000 [USP'U]/ML
5000 INJECTION, SOLUTION INTRAVENOUS; SUBCUTANEOUS 2 TIMES DAILY
Status: DISCONTINUED | OUTPATIENT
Start: 2023-08-15 | End: 2023-08-17 | Stop reason: HOSPADM

## 2023-08-15 RX ORDER — LIDOCAINE 4 G/G
1 PATCH TOPICAL DAILY PRN
Status: DISCONTINUED | OUTPATIENT
Start: 2023-08-15 | End: 2023-08-17 | Stop reason: HOSPADM

## 2023-08-15 RX ORDER — SODIUM CHLORIDE 0.9 % (FLUSH) 0.9 %
5-40 SYRINGE (ML) INJECTION EVERY 12 HOURS SCHEDULED
Status: DISCONTINUED | OUTPATIENT
Start: 2023-08-15 | End: 2023-08-17 | Stop reason: HOSPADM

## 2023-08-15 RX ORDER — CALCIUM CARBONATE 500 MG/1
500 TABLET, CHEWABLE ORAL 3 TIMES DAILY PRN
Status: DISCONTINUED | OUTPATIENT
Start: 2023-08-15 | End: 2023-08-17 | Stop reason: HOSPADM

## 2023-08-15 RX ORDER — ATORVASTATIN CALCIUM 20 MG/1
10 TABLET, FILM COATED ORAL NIGHTLY
Status: DISCONTINUED | OUTPATIENT
Start: 2023-08-15 | End: 2023-08-17 | Stop reason: HOSPADM

## 2023-08-15 RX ORDER — DOCUSATE SODIUM 100 MG/1
100 CAPSULE, LIQUID FILLED ORAL 2 TIMES DAILY
Status: DISCONTINUED | OUTPATIENT
Start: 2023-08-15 | End: 2023-08-17 | Stop reason: HOSPADM

## 2023-08-15 RX ORDER — ACETYLCYSTEINE 200 MG/ML
600 SOLUTION ORAL; RESPIRATORY (INHALATION) 2 TIMES DAILY PRN
Status: DISCONTINUED | OUTPATIENT
Start: 2023-08-15 | End: 2023-08-17 | Stop reason: HOSPADM

## 2023-08-15 RX ORDER — FERROUS SULFATE 325(65) MG
325 TABLET ORAL 2 TIMES DAILY
Status: DISCONTINUED | OUTPATIENT
Start: 2023-08-15 | End: 2023-08-17 | Stop reason: HOSPADM

## 2023-08-15 RX ORDER — AMLODIPINE BESYLATE 5 MG/1
5 TABLET ORAL DAILY
Status: DISCONTINUED | OUTPATIENT
Start: 2023-08-16 | End: 2023-08-16

## 2023-08-15 RX ORDER — MECOBALAMIN 5000 MCG
10 TABLET,DISINTEGRATING ORAL NIGHTLY PRN
Status: DISCONTINUED | OUTPATIENT
Start: 2023-08-15 | End: 2023-08-17 | Stop reason: HOSPADM

## 2023-08-15 RX ORDER — LISINOPRIL 5 MG/1
2.5 TABLET ORAL DAILY
Status: DISCONTINUED | OUTPATIENT
Start: 2023-08-16 | End: 2023-08-17 | Stop reason: HOSPADM

## 2023-08-15 RX ORDER — 0.9 % SODIUM CHLORIDE 0.9 %
500 INTRAVENOUS SOLUTION INTRAVENOUS ONCE
Status: COMPLETED | OUTPATIENT
Start: 2023-08-15 | End: 2023-08-15

## 2023-08-15 RX ORDER — ONDANSETRON 2 MG/ML
4 INJECTION INTRAMUSCULAR; INTRAVENOUS EVERY 6 HOURS PRN
Status: DISCONTINUED | OUTPATIENT
Start: 2023-08-15 | End: 2023-08-17 | Stop reason: HOSPADM

## 2023-08-15 RX ORDER — ALBUTEROL SULFATE 2.5 MG/3ML
2.5 SOLUTION RESPIRATORY (INHALATION) EVERY 4 HOURS PRN
Status: DISCONTINUED | OUTPATIENT
Start: 2023-08-15 | End: 2023-08-17 | Stop reason: HOSPADM

## 2023-08-15 RX ORDER — SODIUM CHLORIDE 9 MG/ML
INJECTION, SOLUTION INTRAVENOUS PRN
Status: DISCONTINUED | OUTPATIENT
Start: 2023-08-15 | End: 2023-08-17 | Stop reason: HOSPADM

## 2023-08-15 RX ORDER — ONDANSETRON 4 MG/1
4 TABLET, ORALLY DISINTEGRATING ORAL EVERY 8 HOURS PRN
Status: DISCONTINUED | OUTPATIENT
Start: 2023-08-15 | End: 2023-08-17 | Stop reason: HOSPADM

## 2023-08-15 RX ORDER — BUDESONIDE 0.5 MG/2ML
0.5 INHALANT ORAL
Status: DISCONTINUED | OUTPATIENT
Start: 2023-08-15 | End: 2023-08-17 | Stop reason: HOSPADM

## 2023-08-15 RX ORDER — POLYETHYLENE GLYCOL 3350 17 G/17G
17 POWDER, FOR SOLUTION ORAL DAILY PRN
Status: DISCONTINUED | OUTPATIENT
Start: 2023-08-15 | End: 2023-08-17 | Stop reason: HOSPADM

## 2023-08-15 RX ADMIN — HEPARIN SODIUM 5000 UNITS: 5000 INJECTION INTRAVENOUS; SUBCUTANEOUS at 22:40

## 2023-08-15 RX ADMIN — BUDESONIDE 500 MCG: 0.5 INHALANT ORAL at 22:55

## 2023-08-15 RX ADMIN — METHYLPREDNISOLONE SODIUM SUCCINATE 60 MG: 125 INJECTION, POWDER, FOR SOLUTION INTRAMUSCULAR; INTRAVENOUS at 18:27

## 2023-08-15 RX ADMIN — ATORVASTATIN CALCIUM 10 MG: 20 TABLET, FILM COATED ORAL at 22:41

## 2023-08-15 RX ADMIN — IPRATROPIUM BROMIDE 0.5 MG: 0.5 SOLUTION RESPIRATORY (INHALATION) at 22:55

## 2023-08-15 RX ADMIN — SODIUM CHLORIDE 500 ML: 9 INJECTION, SOLUTION INTRAVENOUS at 17:28

## 2023-08-15 RX ADMIN — METHYLPREDNISOLONE SODIUM SUCCINATE 60 MG: 125 INJECTION, POWDER, FOR SOLUTION INTRAMUSCULAR; INTRAVENOUS at 19:44

## 2023-08-15 RX ADMIN — SODIUM CHLORIDE, PRESERVATIVE FREE 10 ML: 5 INJECTION INTRAVENOUS at 22:46

## 2023-08-15 RX ADMIN — FERROUS SULFATE TAB 325 MG (65 MG ELEMENTAL FE) 325 MG: 325 (65 FE) TAB at 22:41

## 2023-08-15 RX ADMIN — DOCUSATE SODIUM 100 MG: 100 CAPSULE, LIQUID FILLED ORAL at 23:00

## 2023-08-15 RX ADMIN — SODIUM POLYSTYRENE SULFONATE 15 G: 15 SUSPENSION ORAL; RECTAL at 22:40

## 2023-08-15 RX ADMIN — ARFORMOTEROL TARTRATE 15 MCG: 15 SOLUTION RESPIRATORY (INHALATION) at 22:55

## 2023-08-15 RX ADMIN — AZITHROMYCIN MONOHYDRATE 500 MG: 500 INJECTION, POWDER, LYOPHILIZED, FOR SOLUTION INTRAVENOUS at 18:30

## 2023-08-15 RX ADMIN — Medication 10 MG: at 22:41

## 2023-08-15 RX ADMIN — IPRATROPIUM BROMIDE AND ALBUTEROL SULFATE 1 DOSE: 2.5; .5 SOLUTION RESPIRATORY (INHALATION) at 19:04

## 2023-08-15 RX ADMIN — ASPIRIN 325 MG: 325 TABLET ORAL at 22:41

## 2023-08-15 ASSESSMENT — ENCOUNTER SYMPTOMS
CHEST TIGHTNESS: 0
ABDOMINAL PAIN: 0
SHORTNESS OF BREATH: 1
NAUSEA: 0
VOMITING: 0
EYE REDNESS: 0
EYE PAIN: 0
DIARRHEA: 0
COUGH: 1
SORE THROAT: 0

## 2023-08-15 ASSESSMENT — PAIN - FUNCTIONAL ASSESSMENT
PAIN_FUNCTIONAL_ASSESSMENT: NONE - DENIES PAIN
PAIN_FUNCTIONAL_ASSESSMENT: NONE - DENIES PAIN

## 2023-08-15 NOTE — ED PROVIDER NOTES
[facility] EMERGENCY DEPT  eMERGENCY dEPARTMENT eNCOUnter      Pt Name: Ambreen Jaime  MRN: [id number]  [facility] 5/13/1932  Date of evaluation: 8/15/2023  Provider: Renny Weller MD    CHIEF COMPLAINT       Chief Complaint   Patient presents with    Shortness of Breath     Worsening SOB, recent admission for similar, 2L O2 at baseline    Cough     Chronic, unable to get phlegm out         HISTORY OF PRESENT ILLNESS   (Location/Symptom, Timing/Onset,Context/Setting, Quality, Duration, Modifying Factors, Severity)  Note limiting factors. HPI    Ambreen Jaime is a 80 y.o. female with PMH of ITP, GRACIE, CHF, COPD on 2 L nasal cannula at baseline, AAA, hypertension, and recently diagnosed bilateral lung nodules concerning for metastasis (unknown primary) who presents to the emergency department with CC of worsening cough and shortness of breath since discharge on 8/11/2023. Denies fevers or chills, nausea or vomiting. She reports worsening cough productive of yellow-green phlegm and shortness of breath. She reports generalized weakness and loss of appetite. Denies fevers, chills, vomiting, diarrhea. Patient is unable to hold a conversation in the ED without becoming dyspneic and having severe coughing fits. She specifically denies any associated chest pain. NursingNotes were reviewed. REVIEW OF SYSTEMS    (2-9 systems for level 4, 10 or more for level 5)     Review of Systems   Constitutional:  Positive for appetite change and fatigue. Negative for chills and fever. HENT:  Negative for congestion and sore throat. Eyes:  Negative for pain and redness. Respiratory:  Positive for cough and shortness of breath. Negative for chest tightness. Cardiovascular:  Negative for chest pain and leg swelling. Gastrointestinal:  Negative for abdominal pain, diarrhea, nausea and vomiting. Genitourinary:  Negative for dysuria and urgency. Musculoskeletal:  Negative for neck pain and neck stiffness.    Skin:  Negative

## 2023-08-16 PROBLEM — R05.3 CHRONIC COUGH: Status: ACTIVE | Noted: 2023-08-16

## 2023-08-16 PROBLEM — Z03.89 OBSERVATION FOR SUSPECTED MALIGNANT NEOPLASM: Status: ACTIVE | Noted: 2023-08-16

## 2023-08-16 PROBLEM — I48.91 ATRIAL FIBRILLATION WITH RAPID VENTRICULAR RESPONSE (HCC): Status: ACTIVE | Noted: 2023-08-16

## 2023-08-16 PROBLEM — R91.8 ABNORMAL CT SCAN OF LUNG: Status: ACTIVE | Noted: 2023-08-16

## 2023-08-16 LAB
ANION GAP SERPL CALCULATED.3IONS-SCNC: 8 MMOL/L (ref 7–19)
BACTERIA URNS QL MICRO: NEGATIVE /HPF
BASO STIPL BLD QL SMEAR: ABNORMAL
BASOPHILS # BLD: 0 K/UL (ref 0–0.2)
BASOPHILS NFR BLD: 0.5 % (ref 0–1)
BILIRUB UR QL STRIP: NEGATIVE
BUN SERPL-MCNC: 27 MG/DL (ref 8–23)
CALCIUM SERPL-MCNC: 8.7 MG/DL (ref 8.2–9.6)
CHLORIDE SERPL-SCNC: 98 MMOL/L (ref 98–111)
CLARITY UR: CLEAR
CO2 SERPL-SCNC: 33 MMOL/L (ref 22–29)
COLOR UR: YELLOW
CREAT SERPL-MCNC: 1 MG/DL (ref 0.5–0.9)
CRYSTALS URNS MICRO: ABNORMAL /HPF
EKG P AXIS: 64 DEGREES
EKG P AXIS: NORMAL DEGREES
EKG P-R INTERVAL: 146 MS
EKG P-R INTERVAL: NORMAL MS
EKG Q-T INTERVAL: 276 MS
EKG Q-T INTERVAL: 346 MS
EKG QRS DURATION: 66 MS
EKG QRS DURATION: 70 MS
EKG QTC CALCULATION (BAZETT): 412 MS
EKG QTC CALCULATION (BAZETT): 419 MS
EKG T AXIS: 75 DEGREES
EKG T AXIS: 90 DEGREES
EOSINOPHIL # BLD: 0 K/UL (ref 0–0.6)
EOSINOPHIL NFR BLD: 0 % (ref 0–5)
EPI CELLS #/AREA URNS AUTO: 0 /HPF (ref 0–5)
ERYTHROCYTE [DISTWIDTH] IN BLOOD BY AUTOMATED COUNT: 12.7 % (ref 11.5–14.5)
GLUCOSE BLD-MCNC: 121 MG/DL (ref 70–99)
GLUCOSE BLD-MCNC: 286 MG/DL (ref 70–99)
GLUCOSE SERPL-MCNC: 265 MG/DL (ref 74–109)
GLUCOSE UR STRIP.AUTO-MCNC: 250 MG/DL
HBA1C MFR BLD: 5.9 % (ref 4–6)
HCT VFR BLD AUTO: 32.4 % (ref 37–47)
HGB BLD-MCNC: 9.3 G/DL (ref 12–16)
HGB UR STRIP.AUTO-MCNC: NEGATIVE MG/L
HYALINE CASTS #/AREA URNS AUTO: 2 /HPF (ref 0–8)
HYPOCHROMIA BLD QL SMEAR: ABNORMAL
IMM GRANULOCYTES # BLD: 0 K/UL
KETONES UR STRIP.AUTO-MCNC: NEGATIVE MG/DL
LEUKOCYTE ESTERASE UR QL STRIP.AUTO: NEGATIVE
LYMPHOCYTES # BLD: 0.4 K/UL (ref 1.1–4.5)
LYMPHOCYTES NFR BLD: 8.1 % (ref 20–40)
MCH RBC QN AUTO: 26.3 PG (ref 27–31)
MCHC RBC AUTO-ENTMCNC: 28.7 G/DL (ref 33–37)
MCV RBC AUTO: 91.8 FL (ref 81–99)
MONOCYTES # BLD: 0 K/UL (ref 0–0.9)
MONOCYTES NFR BLD: 0.9 % (ref 0–10)
NEUTROPHILS # BLD: 3.9 K/UL (ref 1.5–7.5)
NEUTS SEG NFR BLD: 90.3 % (ref 50–65)
NITRITE UR QL STRIP.AUTO: NEGATIVE
PERFORMED ON: ABNORMAL
PERFORMED ON: ABNORMAL
PH UR STRIP.AUTO: 5.5 [PH] (ref 5–8)
PLATELET # BLD AUTO: 184 K/UL (ref 130–400)
PMV BLD AUTO: 9.7 FL (ref 9.4–12.3)
POLYCHROMASIA BLD QL SMEAR: ABNORMAL
POTASSIUM SERPL-SCNC: 4.6 MMOL/L (ref 3.5–5)
PROT UR STRIP.AUTO-MCNC: 30 MG/DL
RBC # BLD AUTO: 3.53 M/UL (ref 4.2–5.4)
RBC #/AREA URNS AUTO: 1 /HPF (ref 0–4)
SODIUM SERPL-SCNC: 139 MMOL/L (ref 136–145)
SP GR UR STRIP.AUTO: 1.02 (ref 1–1.03)
TROPONIN T SERPL-MCNC: 0.05 NG/ML (ref 0–0.03)
UROBILINOGEN UR STRIP.AUTO-MCNC: 1 E.U./DL
WBC # BLD AUTO: 4.3 K/UL (ref 4.8–10.8)
WBC #/AREA URNS AUTO: 1 /HPF (ref 0–5)

## 2023-08-16 PROCEDURE — 1210000000 HC MED SURG R&B

## 2023-08-16 PROCEDURE — 2700000000 HC OXYGEN THERAPY PER DAY

## 2023-08-16 PROCEDURE — 99223 1ST HOSP IP/OBS HIGH 75: CPT | Performed by: PHYSICIAN ASSISTANT

## 2023-08-16 PROCEDURE — 6370000000 HC RX 637 (ALT 250 FOR IP): Performed by: PHYSICIAN ASSISTANT

## 2023-08-16 PROCEDURE — 94640 AIRWAY INHALATION TREATMENT: CPT

## 2023-08-16 PROCEDURE — 83036 HEMOGLOBIN GLYCOSYLATED A1C: CPT

## 2023-08-16 PROCEDURE — 99222 1ST HOSP IP/OBS MODERATE 55: CPT | Performed by: INTERNAL MEDICINE

## 2023-08-16 PROCEDURE — 2580000003 HC RX 258: Performed by: HOSPITALIST

## 2023-08-16 PROCEDURE — 6370000000 HC RX 637 (ALT 250 FOR IP): Performed by: STUDENT IN AN ORGANIZED HEALTH CARE EDUCATION/TRAINING PROGRAM

## 2023-08-16 PROCEDURE — 36415 COLL VENOUS BLD VENIPUNCTURE: CPT

## 2023-08-16 PROCEDURE — 81001 URINALYSIS AUTO W/SCOPE: CPT

## 2023-08-16 PROCEDURE — 93010 ELECTROCARDIOGRAM REPORT: CPT | Performed by: INTERNAL MEDICINE

## 2023-08-16 PROCEDURE — 6360000002 HC RX W HCPCS: Performed by: HOSPITALIST

## 2023-08-16 PROCEDURE — 84484 ASSAY OF TROPONIN QUANT: CPT

## 2023-08-16 PROCEDURE — 80048 BASIC METABOLIC PNL TOTAL CA: CPT

## 2023-08-16 PROCEDURE — 6370000000 HC RX 637 (ALT 250 FOR IP): Performed by: HOSPITALIST

## 2023-08-16 PROCEDURE — 82962 GLUCOSE BLOOD TEST: CPT

## 2023-08-16 PROCEDURE — 94760 N-INVAS EAR/PLS OXIMETRY 1: CPT

## 2023-08-16 PROCEDURE — 85025 COMPLETE CBC W/AUTO DIFF WBC: CPT

## 2023-08-16 RX ORDER — METOPROLOL SUCCINATE 25 MG/1
25 TABLET, EXTENDED RELEASE ORAL DAILY
Status: DISCONTINUED | OUTPATIENT
Start: 2023-08-16 | End: 2023-08-17 | Stop reason: HOSPADM

## 2023-08-16 RX ORDER — AMLODIPINE BESYLATE 5 MG/1
2.5 TABLET ORAL DAILY
Status: DISCONTINUED | OUTPATIENT
Start: 2023-08-17 | End: 2023-08-17 | Stop reason: HOSPADM

## 2023-08-16 RX ORDER — PREDNISONE 20 MG/1
40 TABLET ORAL DAILY
Status: DISCONTINUED | OUTPATIENT
Start: 2023-08-16 | End: 2023-08-17 | Stop reason: HOSPADM

## 2023-08-16 RX ORDER — DEXTROSE MONOHYDRATE 100 MG/ML
INJECTION, SOLUTION INTRAVENOUS CONTINUOUS PRN
Status: DISCONTINUED | OUTPATIENT
Start: 2023-08-16 | End: 2023-08-17 | Stop reason: HOSPADM

## 2023-08-16 RX ORDER — GUAIFENESIN 600 MG/1
600 TABLET, EXTENDED RELEASE ORAL 2 TIMES DAILY
Status: DISCONTINUED | OUTPATIENT
Start: 2023-08-16 | End: 2023-08-17 | Stop reason: HOSPADM

## 2023-08-16 RX ORDER — INSULIN LISPRO 100 [IU]/ML
0-4 INJECTION, SOLUTION INTRAVENOUS; SUBCUTANEOUS NIGHTLY
Status: DISCONTINUED | OUTPATIENT
Start: 2023-08-16 | End: 2023-08-17 | Stop reason: HOSPADM

## 2023-08-16 RX ORDER — INSULIN LISPRO 100 [IU]/ML
0-4 INJECTION, SOLUTION INTRAVENOUS; SUBCUTANEOUS
Status: DISCONTINUED | OUTPATIENT
Start: 2023-08-16 | End: 2023-08-17 | Stop reason: HOSPADM

## 2023-08-16 RX ORDER — GUAIFENESIN/DEXTROMETHORPHAN 100-10MG/5
10 SYRUP ORAL EVERY 4 HOURS PRN
Status: DISCONTINUED | OUTPATIENT
Start: 2023-08-16 | End: 2023-08-17 | Stop reason: HOSPADM

## 2023-08-16 RX ADMIN — ARFORMOTEROL TARTRATE 15 MCG: 15 SOLUTION RESPIRATORY (INHALATION) at 07:08

## 2023-08-16 RX ADMIN — GUAIFENESIN SYRUP AND DEXTROMETHORPHAN 10 ML: 100; 10 SYRUP ORAL at 01:33

## 2023-08-16 RX ADMIN — AZITHROMYCIN MONOHYDRATE 500 MG: 500 INJECTION, POWDER, LYOPHILIZED, FOR SOLUTION INTRAVENOUS at 17:38

## 2023-08-16 RX ADMIN — IPRATROPIUM BROMIDE 0.5 MG: 0.5 SOLUTION RESPIRATORY (INHALATION) at 19:54

## 2023-08-16 RX ADMIN — GUAIFENESIN SYRUP AND DEXTROMETHORPHAN 10 ML: 100; 10 SYRUP ORAL at 09:37

## 2023-08-16 RX ADMIN — IPRATROPIUM BROMIDE 0.5 MG: 0.5 SOLUTION RESPIRATORY (INHALATION) at 10:36

## 2023-08-16 RX ADMIN — BUDESONIDE 500 MCG: 0.5 INHALANT ORAL at 19:54

## 2023-08-16 RX ADMIN — GUAIFENESIN 600 MG: 600 TABLET ORAL at 20:07

## 2023-08-16 RX ADMIN — IPRATROPIUM BROMIDE 0.5 MG: 0.5 SOLUTION RESPIRATORY (INHALATION) at 14:52

## 2023-08-16 RX ADMIN — SODIUM CHLORIDE, PRESERVATIVE FREE 10 ML: 5 INJECTION INTRAVENOUS at 09:34

## 2023-08-16 RX ADMIN — AMLODIPINE BESYLATE 5 MG: 5 TABLET ORAL at 09:28

## 2023-08-16 RX ADMIN — Medication 10 MG: at 20:06

## 2023-08-16 RX ADMIN — PANTOPRAZOLE SODIUM 40 MG: 40 TABLET, DELAYED RELEASE ORAL at 09:36

## 2023-08-16 RX ADMIN — LISINOPRIL 2.5 MG: 5 TABLET ORAL at 09:28

## 2023-08-16 RX ADMIN — ATORVASTATIN CALCIUM 10 MG: 20 TABLET, FILM COATED ORAL at 20:06

## 2023-08-16 RX ADMIN — FERROUS SULFATE TAB 325 MG (65 MG ELEMENTAL FE) 325 MG: 325 (65 FE) TAB at 20:06

## 2023-08-16 RX ADMIN — BUDESONIDE 500 MCG: 0.5 INHALANT ORAL at 07:08

## 2023-08-16 RX ADMIN — PREDNISONE 40 MG: 20 TABLET ORAL at 09:34

## 2023-08-16 RX ADMIN — DOCUSATE SODIUM 100 MG: 100 CAPSULE, LIQUID FILLED ORAL at 20:06

## 2023-08-16 RX ADMIN — SODIUM CHLORIDE, PRESERVATIVE FREE 10 ML: 5 INJECTION INTRAVENOUS at 20:08

## 2023-08-16 RX ADMIN — GUAIFENESIN 600 MG: 600 TABLET ORAL at 12:33

## 2023-08-16 RX ADMIN — IPRATROPIUM BROMIDE 0.5 MG: 0.5 SOLUTION RESPIRATORY (INHALATION) at 07:08

## 2023-08-16 RX ADMIN — Medication 5000 UNITS: at 09:28

## 2023-08-16 RX ADMIN — ARFORMOTEROL TARTRATE 15 MCG: 15 SOLUTION RESPIRATORY (INHALATION) at 19:54

## 2023-08-16 RX ADMIN — FERROUS SULFATE TAB 325 MG (65 MG ELEMENTAL FE) 325 MG: 325 (65 FE) TAB at 09:28

## 2023-08-16 RX ADMIN — DOCUSATE SODIUM 100 MG: 100 CAPSULE, LIQUID FILLED ORAL at 09:28

## 2023-08-16 ASSESSMENT — ENCOUNTER SYMPTOMS
NAUSEA: 0
ABDOMINAL PAIN: 0
COUGH: 1
SHORTNESS OF BREATH: 0

## 2023-08-16 NOTE — PROGRESS NOTES
Oral, BID, aSde Mendez PA-C, 600 mg at 08/16/23 1233    albuterol (PROVENTIL) (2.5 MG/3ML) 0.083% nebulizer solution 2.5 mg, 2.5 mg, Nebulization, Q4H PRN, Eusebio Blackman MD    acetylcysteine (MUCOMYST) 20 % solution 600 mg, 600 mg, Inhalation, BID PRN, Eusebio Blackman MD    azithromycin (ZITHROMAX) 500 mg in sodium chloride 0.9 % 250 mL IVPB ([id number]), 500 mg, IntraVENous, Q24H, Eusebio Blackman MD    pantoprazole (PROTONIX) tablet 40 mg, 40 mg, Oral, QAM AC, Eusebio Blackman MD, 40 mg at 08/16/23 0936    arformoterol tartrate (BROVANA) nebulizer solution 15 mcg, 15 mcg, Nebulization, BID RT, Eusebio Blackman MD, 15 mcg at 08/16/23 0708    budesonide (PULMICORT) nebulizer suspension 500 mcg, 0.5 mg, Nebulization, BID RT, Eusebio Blackman MD, 500 mcg at 08/16/23 0708    ipratropium (ATROVENT) 0.02 % nebulizer solution 0.5 mg, 0.5 mg, Nebulization, 4x Daily RT, Eusebio Blackman MD, 0.5 mg at 08/16/23 1036    atorvastatin (LIPITOR) tablet 10 mg, 10 mg, Oral, Nightly, Eusebio Blackman MD, 10 mg at 08/15/23 2241    lisinopril (PRINIVIL;ZESTRIL) tablet 2.5 mg, 2.5 mg, Oral, Daily, Eusebio Blackman MD, 2.5 mg at 08/16/23 0928    lidocaine 4 % external patch 1 patch, 1 patch, Topical, Daily PRN, Eusebio Blackman MD    ferrous sulfate (IRON 325) tablet 325 mg, 325 mg, Oral, BID, Eusebio Blackman MD, 325 mg at 08/16/23 7988    docusate sodium (COLACE) capsule 100 mg, 100 mg, Oral, BID, Eusebio Blackman MD, 100 mg at 08/16/23 7088    melatonin disintegrating tablet 10 mg, 10 mg, Oral, Nightly, Eusebio Blackman MD, 10 mg at 08/15/23 2111    melatonin disintegrating tablet 10 mg, 10 mg, Oral, Nightly PRN, Eusebio Blackman MD    polyethylene glycol (GLYCOLAX) packet 17 g, 17 g, Oral, Daily PRN, Eusebio Blackman MD    calcium carbonate (TUMS) chewable tablet 500 mg, 500 mg, Oral, TID PRN, Eusebio Blackman MD    vitamin D (CHOLECALCIFEROL) capsule 5,000 Units, 5,000 Units, Oral, Daily, Eusebio Blackman MD, 5,000 Units at 08/16/23 7004 carboxymethylcellulose (REFRESH PLUS) 0.5 % ophthalmic solution 2 drop, 2 drop, Both Eyes, Q4H PRN, Steph Navarrete MD    amLODIPine (NORVASC) tablet 5 mg, 5 mg, Oral, Daily, Steph Navarrete MD, 5 mg at 08/16/23 3136    sodium chloride flush 0.9 % injection 5-40 mL, 5-40 mL, IntraVENous, 2 times per day, Steph Navarrete MD, 10 mL at 08/16/23 0934    sodium chloride flush 0.9 % injection 5-40 mL, 5-40 mL, IntraVENous, PRN, Steph Navarrete MD    0.9 % sodium chloride infusion, , IntraVENous, PRN, Steph Navarrete MD    heparin (porcine) injection 5,000 Units, 5,000 Units, SubCUTAneous, BID, Steph Navarrete MD, 5,000 Units at 08/15/23 2240    ondansetron (ZOFRAN-ODT) disintegrating tablet 4 mg, 4 mg, Oral, Q8H PRN **OR** ondansetron (ZOFRAN) injection 4 mg, 4 mg, IntraVENous, Q6H PRN, Steph Navarrete MD    acetaminophen (TYLENOL) tablet 500 mg, 500 mg, Oral, Q4H PRN, Steph Navarrete MD      Imaging:  CT ABDOMEN PELVIS WO CONTRAST Additional Contrast? None    Result Date: 8/10/2023  - Lack of IV contrast limits the evaluation for infections and neoplastic processes. - No acute findings. - Nonobstructing 0.4 cm right renal calculus. Bilateral renal simple and hemorrhagic/proteinaceous renal cysts. - Cholelithiasis without evidence of acute cholecystitis. - Left lung base pulmonary nodules measuring up to 0.8 cm. Please review the report of the concurrently obtained CT chest. - Possible anemia. Correlation complete blood count recommended. All CT scans are performed using dose optimization techniques as appropriate to the performed exam and include at least one of the following: Automated exposure control, adjustment of the mA and/or kV according to size, and the use of iterative reconstruction technique. ______________________________________ Electronically signed by: Michael Rabago M.D.  Date:     08/10/2023 Time:    16:04     CT CHEST WO CONTRAST    Result Date: 8/10/2023   Findings compatible with primary and/or

## 2023-08-16 NOTE — PLAN OF CARE
Problem: Discharge Planning  Goal: Discharge to home or other facility with appropriate resources  Outcome: Progressing  Flowsheets (Taken 8/16/2023 1128)  Discharge to home or other facility with appropriate resources: Identify barriers to discharge with patient and caregiver     Problem: Safety - Adult  Goal: Free from fall injury  Outcome: Progressing     Problem: Chronic Conditions and Co-morbidities  Goal: Patient's chronic conditions and co-morbidity symptoms are monitored and maintained or improved  Outcome: Progressing  Flowsheets (Taken 8/16/2023 1128)  Care Plan - Patient's Chronic Conditions and Co-Morbidity Symptoms are Monitored and Maintained or Improved: Monitor and assess patient's chronic conditions and comorbid symptoms for stability, deterioration, or improvement

## 2023-08-16 NOTE — ACP (ADVANCE CARE PLANNING)
Advance Care Planning     Advance Care Planning (ACP) Physician/NP/PA (Provider) Conversation      Date of ACP Conversation:08/16/2023    Conversation Conducted with:   Patient with Decision Making [address] Decision Maker:    Current Designated Health Care Decision Maker:    Primary Decision Maker: Chin Escalante/Arely - Child - [phone]    Care Preferences:      Ventilation:  no    Resuscitation:  no    Length of Voluntary ACP Conversation in minutes:  10 minutes included w/ total consult time    Seng Scott PA-C

## 2023-08-16 NOTE — CONSULTS
Palliative Care Consult Note    8/16/2023 8:37 AM  Subjective:  Admit Date: 8/15/2023  PCP: Ranjan Mcgarry MD    Date of Service: 8/16/2023    Reason for Consultation:  Goals of Care, Code Status, Family Support     History Obtained From: EMR/Patient and their Family    History Of Present Illness: The patient is a 80 y.o. 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 Chest x-ray reported diffuse, chronic appearing interstitial attenuation with cardiomegaly and atherosclerotic disease. She was tachycardic on arrival and EKG revealed atrial fibrillation with rapid ventricular response. This was reported to improve with IV fluids. She was admitted to hospitalist service with concern for COPD exacerbation and was placed on Solu-Medrol, nebulizer treatments, and azithromycin. Respiratory viral panel was unremarkable. Troponin was 0.05 and has remained flat. BNP is 1035. Palliative care was consulted for goals of care. Ms. Jose Carlos Sandoval was last seen by this provider on 8/11/2023. During that hospitalization she was found to have numerous bilateral solid and groundglass nodules. This was suspected to be from a metastatic process. This was discussed with her and she declined further cancer work-up stating that she would not be seeking treatment. She was discharged home with [employer] and case management help to assist set up with home health, active day, Meals on Wheels, and a new nebulizer. Hospice care was discussed with her during that admission and she lives at home independently and felt that she did not need hospice services at that time.     Past Medical History:        Diagnosis Date    Abdominal aortic aneurysm (AAA) (HCC)     Anemia     iron deficiecy    Anxiety     Arthritis     CHF (congestive heart failure) ([address]) episodes. She has periodic scant hemoptysis but no gross hemoptysis. We again addressed concern for cancer and she again confirms she does not intend to pursue further cancer work-up or anticancer therapy at this time. She is . Her spouse  in . She does have a living son who resides in [place]. She currently lives independently in [facility] and has neighbors that assist her. She does have someone who occasionally performs housekeeping for her she can no longer do this herself she cannot make her own bed. She cannot cook her own meals. During her last hospital stay social work set up active day, Meals on [employer], and home health. We reviewed home hospice services and need for symptom management. At this time she is receptive to transition from home health hospice services. We discussed the need for caregiver support. She is hesitant to give up her independence. She has lived with her son in the past but states she does not want to burden him with her returning to his home at this time. She is very hesitant to consider facility placement but is willing to speak with case management about facilities closer to her son as long as insurance can pay for it. She does not have financial means to pay for any private services. I called the patient's son, Abi Rodriguez. He is aware of her current and previous hospital stay concern for lung cancer. He is in agreement of her decision not to pursue further work-up or anticancer therapy. We discussed concern for need of caregiver support at this time. He is in agreement that he feels it is unsafe for her to return to living independently. He states that she is \"sharp as a tac\" and is in agreement that she can make her own decisions. He would be supportive of nursing facility placement near his home in [place] if she is agreeable to this. He is also receptive to involvement of outpatient hospice for her.   I discussed this with

## 2023-08-16 NOTE — CARE COORDINATION
08/16/23 1103   Readmission Assessment   Previous Disposition Home with Home Health   Who is being Interviewed Patient   What was the patient's/caregiver's perception as to why they think they needed to return back to the hospital? Other (Comment)  (continued to have sob and gen weakness)   Did you visit your Primary Care Physician after you left the hospital, before you returned this time? No   Why weren't you able to visit your PCP? Other (Comment)  (came in before appt)   Did you see a specialist, such as Cardiac, Pulmonary, Orthopedic Physician, etc. after you left the hospital? No   Who advised the patient to return to the hospital? Self-referral   Does the patient report anything that got in the way of taking their medications? No   In our efforts to provide the best possible care to you and others like you, can you think of anything that we could have done to help you after you left the hospital the first time, so that you might not have needed to return so soon? Arrange for more help when leaving the hospital;Identify patient's health literacy needs; Improved written discharge instructions; Teaching during hospitalization regarding your illness     Pt dc'd home with HH. Lives alone. Has 02, nebulizer ordered last admit. Came back in for sob. Was in afib- converted back to nsr after ivf given. Will discuss further home needs.   Electronically signed by Luis De León RN on 8/16/2023 at 11:08 AM

## 2023-08-16 NOTE — CARE COORDINATION
Patient is current with [facility] for [address] [place] [address] Rd, PT, OT, MSW Services. Will follow. Please advise when patient discharges. WILL NEED RESUMPTION OF CARE ORDERS TO RESUME HH SERVICES WHEN PATIENT DISCHARGES. Thank you. [address] [facility] [phone]. [phone].     Jez Mckeon RN, Home Care Liaison  [phone] P  Electronically signed by Jez Mckeon on 8/16/2023 at 8:43 AM

## 2023-08-16 NOTE — CARE COORDINATION
Case Management Assessment  Initial Evaluation    Date/Time of Evaluation: 8/16/2023 1:45 PM  Assessment Completed by: Urmila Veras RN    If patient is discharged prior to next notation, then this note serves as note for discharge by case management. Patient Name: Malinda Xavier                   YOB: 1932  Diagnosis: Shortness of breath [R06.02]  Chronic cough [R05.3]  COPD exacerbation ([address]) [J44.1]  Atrial fibrillation with rapid ventricular response ([address]) [I48.91]  COPD with acute exacerbation ([address]) [J44.1]                   Date / Time: 8/15/2023  4:56 PM    Patient Admission Status: Inpatient   Readmission Risk (Low < 19, Mod (19-27), High > 27): Readmission Risk Score: 15.9    Current PCP: Telly Blum MD  PCP verified by CM? Yes    Chart Reviewed: Yes      History Provided by: Patient  Patient Orientation: Alert and Oriented, Person, Place, Situation    Patient Cognition: Alert    Hospitalization in the last 30 days (Readmission):  Yes    If yes, Readmission Assessment in  Navigator will be completed.     Advance Directives:      Code Status: DNR   Patient's Primary Decision Maker is: Legal Next of Kin    Primary Decision Maker: Chin Escalante/Arely  Child - [phone]    Discharge Planning:    Patient lives with: Alone Type of Home: Apartment  Primary Care Giver: Self  Patient Support Systems include: Children, Friends/Neighbors   Current Financial resources:    Current community resources: Hospice  Current services prior to admission: Durable Medical Equipment, Home Care, Oxygen Therapy            Current DME: [facility] (Comment)            Type of Home Care services:       ADLS  Prior functional level: Assistance with the following:, Bathing, Cooking, Housework, Mobility  Current functional level: Assistance with the following:, Bathing, Toileting, Mobility    PT AM-PAC:   /24  OT AM-PAC:   /24    Family can provide assistance at DC: No  Would you like Case Management to discuss the discharge plan with any other family members/significant others, and if so, who? No  Plans to Return to Present Housing: Unknown at present  Other Identified Issues/Barriers to RETURNING to current housing:   Potential Assistance needed at discharge: [address]            Potential DME:    Patient expects to discharge to:    Plan for transportation at discharge:      Financial    Payor: Harsha Garcia / Plan: Maverick Beck / Product Type: *No Product type* /     Does insurance require precert for SNF: Yes    Potential assistance Purchasing Medications: No  Meds-to-Beds request:        [address] [place] [address] - [address] [facility] [address] Melissa Moreno [phone] - [phone]  [facility] [facility] [address] Ana Paula Chaudhry [facility] [address]  Phone: [phone] Fax: [address] [facility], [address] [facility] [address] [address]  Phone: [phone] Fax: [phone]    [id number] - FGVNMDV, [address] [facility] [address]  [place] [phone] [facility] [phone]  [address] [place] [address]  Phone: [phone] Fax: [phone]      Notes:    Factors facilitating achievement of predicted outcomes: Cooperative, Pleasant, Good insight into deficits, and Has needed Durable Medical Equipment at home    Barriers to discharge: Limited family support, No caregiver support, Decreased endurance, and Long standing deficits    Additional Case Management Notes: spoke with pt about dc plans,, pt lives alone, has limited support. Has all dme at home she requires. Noted hospice consult. Talked with pt about nhp options with hospice. Pt does have a payor source to allow for hospice at the facility. Pt is thinking about this option but she is concerned about her apartment and the contents of her belongings. Will cont to follow up as pt does not have a caregiver at home at this time.     The Plan for

## 2023-08-16 NOTE — CONSULTS
MEDICAL ONCOLOGY CONSULTATION    Pt Name: Kaleb Fleming  MRN: [id number]  YOB: 1932  Date of evaluation: 8/16/2023    REASON FOR CONSULTATION: Pulmonary nodules  REQUESTING PHYSICIAN: Hospitalist    History Obtained From:    patient, electronic medical record    HISTORY OF PRESENT ILLNESS:  Uriel Levin was previously seen by me during last hospitalization for findings of pulmonary nodules. Due to her advanced age, frailty and lack of social support I recommended palliative care consultation to discuss goals and expectations. The patient opted for not pursuing any invasive diagnostic work-up of the lung nodules. Information was given to the patient regarding possibility of hospice care. She is now being readmitted with increased shortness of breath. Laboratory studies remarkable for hemoglobin 9.9/MCV 93, normal WBC and normal platelet counts. Potassium 5.3, creatinine 1.1, protein 6.2, normal LFTs  8/10/2023-chest x-ray showed: Cardiomegaly and atherosclerotic disease. Hyperinflation. There is diffuse, chronic appearing interstitial accentuation. No acute infiltrates or consolidated pneumonia. No pleural fluid or pneumothorax. PRIOR ONCOLOGICAL HISTORY  Uriel Levin was first seen by me on 8/11/2023 during patient hospitalization [facility].  I was consulted by the hospitalist.  She has previously been seen in our clinic and was last seen in June 2020 by Abner Rain for a diagnosis of ITP and anemia. Her ITP was initially diagnosed in 2013. She has received IVIG in the past as well as steroids. She has several comorbidities to include a history of CHF, COPD on O2 supplementation, AAA, hypertension. She presented to the ER with multiple complaints to include shortness of breath, cough, decreased appetite, constipation, increased generalized weakness. .  Apparently, she has a history of pulmonary nodule. She has significant difficulty with transportation and attend medical appointments. Laboratory workup in the emergency remarkable for mild elevation of her creatinine 1.1 with EGFR 39, normal electrolytes, normal LFTs, WBC 4.0 with ANC 2.4, lymphopenia 0.8. Ferritin 18.3, iron 59, iron saturation 16, TIBC 373, folate> 20, vitamin B12 522, absolute reticulocyte count 0.0510. Imaging studies as below. 8/30/2017-CT low-dose lung cancer screening showed suspected endobronchial nodule in the medial subsegmental branch of the posterior to basilar segment of the right lower lobe. Highly suspicious for-5 mm nodule in the lingula of the left lobe. 10/13/2018-CTA chest showed evidence of COPD, chronic bronchitis right middle lobe atelectasis, pulmonary hypertension. Multiple low-density nodules/masses in both kidneys probably represent renal cysts. 8/10/2023-CT abdomen/pelvis without contrast remarkable for: Lack of IV contrast limits the evaluation for infections and neoplastic processes. - No acute findings. - Nonobstructing 0.4 cm right renal calculus. Bilateral renal simple and hemorrhagic/proteinaceous renal cysts. - Cholelithiasis without evidence of acute cholecystitis. - Left lung base pulmonary nodules measuring up to 0.8 cm.   8/10/2023-CT chest without contrast remarkable for: Lung Parenchyma and Airways: Central airways are patent without endobronchial lesion. Right-sided volume loss is present with displacement of the cardia medial structures into the right chest. Interval development of numerous bilateral solid and ground-glass nodules measuring up to  0.7 cm left in the left lower lobe and 0.4 cm in the right lower lobe. Mild to moderate emphysema with bilateral scarring/atelectasis most prevalent in the right middle lobe and bronchiectasis. Pleural Space: No pleural effusion. No thoracic lymphadenopathy. Main pulmonary artery is enlarged measuring 3.3 cm. Bones and Soft Tissues: No acute osseous abnormality.   The findings compatible with primary and/or metastatic disease involving

## 2023-08-17 VITALS
TEMPERATURE: 98.6 F | OXYGEN SATURATION: 96 % | RESPIRATION RATE: 18 BRPM | HEART RATE: 85 BPM | DIASTOLIC BLOOD PRESSURE: 84 MMHG | SYSTOLIC BLOOD PRESSURE: 160 MMHG | BODY MASS INDEX: 16.52 KG/M2 | WEIGHT: 96.25 LBS

## 2023-08-17 LAB
ANION GAP SERPL CALCULATED.3IONS-SCNC: 10 MMOL/L (ref 7–19)
BASOPHILS # BLD: 0 K/UL (ref 0–0.2)
BASOPHILS NFR BLD: 0 % (ref 0–1)
BUN SERPL-MCNC: 31 MG/DL (ref 8–23)
CALCIUM SERPL-MCNC: 8.9 MG/DL (ref 8.2–9.6)
CHLORIDE SERPL-SCNC: 100 MMOL/L (ref 98–111)
CO2 SERPL-SCNC: 29 MMOL/L (ref 22–29)
CREAT SERPL-MCNC: 1 MG/DL (ref 0.5–0.9)
EOSINOPHIL # BLD: 0 K/UL (ref 0–0.6)
EOSINOPHIL NFR BLD: 0 % (ref 0–5)
ERYTHROCYTE [DISTWIDTH] IN BLOOD BY AUTOMATED COUNT: 13 % (ref 11.5–14.5)
GLUCOSE BLD-MCNC: 105 MG/DL (ref 70–99)
GLUCOSE BLD-MCNC: 135 MG/DL (ref 70–99)
GLUCOSE SERPL-MCNC: 107 MG/DL (ref 74–109)
HCT VFR BLD AUTO: 32.4 % (ref 37–47)
HGB BLD-MCNC: 9.3 G/DL (ref 12–16)
HYPOCHROMIA BLD QL SMEAR: ABNORMAL
IMM GRANULOCYTES # BLD: 0 K/UL
LYMPHOCYTES # BLD: 0 K/UL (ref 1.1–4.5)
LYMPHOCYTES NFR BLD: 0 % (ref 20–40)
MCH RBC QN AUTO: 26.3 PG (ref 27–31)
MCHC RBC AUTO-ENTMCNC: 28.7 G/DL (ref 33–37)
MCV RBC AUTO: 91.8 FL (ref 81–99)
MONOCYTES # BLD: 0 K/UL (ref 0–0.9)
MONOCYTES NFR BLD: 0 % (ref 0–10)
NEUTROPHILS # BLD: 0 K/UL (ref 1.5–7.5)
NEUTS SEG NFR BLD: 0 % (ref 50–65)
PERFORMED ON: ABNORMAL
PERFORMED ON: ABNORMAL
PLATELET # BLD AUTO: 215 K/UL (ref 130–400)
PLATELET SLIDE REVIEW: ADEQUATE
PMV BLD AUTO: 10 FL (ref 9.4–12.3)
POTASSIUM SERPL-SCNC: 4.6 MMOL/L (ref 3.5–5)
RBC # BLD AUTO: 3.53 M/UL (ref 4.2–5.4)
SODIUM SERPL-SCNC: 139 MMOL/L (ref 136–145)
WBC # BLD AUTO: 8.6 K/UL (ref 4.8–10.8)

## 2023-08-17 PROCEDURE — 85025 COMPLETE CBC W/AUTO DIFF WBC: CPT

## 2023-08-17 PROCEDURE — 2580000003 HC RX 258: Performed by: HOSPITALIST

## 2023-08-17 PROCEDURE — 6370000000 HC RX 637 (ALT 250 FOR IP): Performed by: HOSPITALIST

## 2023-08-17 PROCEDURE — 97530 THERAPEUTIC ACTIVITIES: CPT

## 2023-08-17 PROCEDURE — 6360000002 HC RX W HCPCS: Performed by: HOSPITALIST

## 2023-08-17 PROCEDURE — 94640 AIRWAY INHALATION TREATMENT: CPT

## 2023-08-17 PROCEDURE — 6370000000 HC RX 637 (ALT 250 FOR IP): Performed by: STUDENT IN AN ORGANIZED HEALTH CARE EDUCATION/TRAINING PROGRAM

## 2023-08-17 PROCEDURE — 99231 SBSQ HOSP IP/OBS SF/LOW 25: CPT | Performed by: INTERNAL MEDICINE

## 2023-08-17 PROCEDURE — 99233 SBSQ HOSP IP/OBS HIGH 50: CPT | Performed by: PHYSICIAN ASSISTANT

## 2023-08-17 PROCEDURE — 36415 COLL VENOUS BLD VENIPUNCTURE: CPT

## 2023-08-17 PROCEDURE — 80048 BASIC METABOLIC PNL TOTAL CA: CPT

## 2023-08-17 PROCEDURE — 97161 PT EVAL LOW COMPLEX 20 MIN: CPT

## 2023-08-17 PROCEDURE — 82962 GLUCOSE BLOOD TEST: CPT

## 2023-08-17 PROCEDURE — 6370000000 HC RX 637 (ALT 250 FOR IP): Performed by: PHYSICIAN ASSISTANT

## 2023-08-17 PROCEDURE — 2700000000 HC OXYGEN THERAPY PER DAY

## 2023-08-17 PROCEDURE — 97110 THERAPEUTIC EXERCISES: CPT

## 2023-08-17 RX ORDER — AZITHROMYCIN 500 MG/1
500 TABLET, FILM COATED ORAL DAILY
Qty: 1 TABLET | Refills: 0 | Status: SHIPPED | OUTPATIENT
Start: 2023-08-17 | End: 2023-08-18

## 2023-08-17 RX ORDER — PANTOPRAZOLE SODIUM 40 MG/1
40 TABLET, DELAYED RELEASE ORAL
Qty: 30 TABLET | Refills: 0 | Status: SHIPPED | OUTPATIENT
Start: 2023-08-18

## 2023-08-17 RX ORDER — PREDNISONE 20 MG/1
40 TABLET ORAL DAILY
Qty: 10 TABLET | Refills: 0 | Status: SHIPPED | OUTPATIENT
Start: 2023-08-18 | End: 2023-08-23

## 2023-08-17 RX ORDER — GUAIFENESIN 600 MG/1
600 TABLET, EXTENDED RELEASE ORAL 2 TIMES DAILY
Qty: 60 TABLET | Refills: 0 | Status: SHIPPED | OUTPATIENT
Start: 2023-08-17

## 2023-08-17 RX ORDER — METOPROLOL SUCCINATE 25 MG/1
25 TABLET, EXTENDED RELEASE ORAL DAILY
Qty: 30 TABLET | Refills: 0 | Status: SHIPPED | OUTPATIENT
Start: 2023-08-18

## 2023-08-17 RX ADMIN — IPRATROPIUM BROMIDE 0.5 MG: 0.5 SOLUTION RESPIRATORY (INHALATION) at 15:08

## 2023-08-17 RX ADMIN — SODIUM CHLORIDE, PRESERVATIVE FREE 10 ML: 5 INJECTION INTRAVENOUS at 08:20

## 2023-08-17 RX ADMIN — FERROUS SULFATE TAB 325 MG (65 MG ELEMENTAL FE) 325 MG: 325 (65 FE) TAB at 08:19

## 2023-08-17 RX ADMIN — IPRATROPIUM BROMIDE 0.5 MG: 0.5 SOLUTION RESPIRATORY (INHALATION) at 07:50

## 2023-08-17 RX ADMIN — PREDNISONE 40 MG: 20 TABLET ORAL at 08:19

## 2023-08-17 RX ADMIN — BUDESONIDE 500 MCG: 0.5 INHALANT ORAL at 07:50

## 2023-08-17 RX ADMIN — Medication 5000 UNITS: at 08:19

## 2023-08-17 RX ADMIN — IPRATROPIUM BROMIDE 0.5 MG: 0.5 SOLUTION RESPIRATORY (INHALATION) at 11:05

## 2023-08-17 RX ADMIN — AMLODIPINE BESYLATE 2.5 MG: 5 TABLET ORAL at 08:19

## 2023-08-17 RX ADMIN — GUAIFENESIN 600 MG: 600 TABLET ORAL at 08:19

## 2023-08-17 RX ADMIN — DOCUSATE SODIUM 100 MG: 100 CAPSULE, LIQUID FILLED ORAL at 08:19

## 2023-08-17 RX ADMIN — ARFORMOTEROL TARTRATE 15 MCG: 15 SOLUTION RESPIRATORY (INHALATION) at 07:50

## 2023-08-17 RX ADMIN — PANTOPRAZOLE SODIUM 40 MG: 40 TABLET, DELAYED RELEASE ORAL at 06:08

## 2023-08-17 RX ADMIN — METOPROLOL SUCCINATE 25 MG: 25 TABLET, EXTENDED RELEASE ORAL at 08:19

## 2023-08-17 NOTE — CARE COORDINATION
Update: Abida Faye ordered Pt portable oxygen tank  to be delivered to Pt room at hospital prior to D/C from [facility] spoke to Eulogio Rojas at [facility] [phone] Electronically signed by Tara Clayton on 8/17/2023 at 2:04 PM

## 2023-08-17 NOTE — CARE COORDINATION
Spoke with pt ,  Keyanna (palliative care) and Jose Angel ( hospice) today. Pt is very adamant that she wants to return home. Pt is very much alert and capable of making her own decisions. Noted therapy notes  about pt needing 24hr assist at home. Plan is for pt to return home with [facility]. Hospice will also be following closely once pt is home. If pt independent enough, will transition to hospice. Pt friend/neighbor will be transporting pt home. Pt uses [employer] for home 02 . Does not have her portable tank here and neighbor not able to get into her apt. Will notify [employer] to see if they will bring a portable tank in order fo her get home with 02.   Electronically signed by Cora Carmona RN on 8/17/2023 at 1:42 PM

## 2023-08-17 NOTE — PROGRESS NOTES
CLINICAL PHARMACY NOTE: MEDS TO BEDS    Total # of Prescriptions Filled: 4   The following medications were delivered to the patient:  Current Discharge Medication List        START taking these medications    Details   metoprolol succinate (TOPROL XL) 25 MG extended release tablet Take 1 tablet by mouth daily  Qty: 30 tablet, Refills: 0      predniSONE (DELTASONE) 20 MG tablet Take 2 tablets by mouth daily for 5 days  Qty: 10 tablet, Refills: 0      guaiFENesin (MUCINEX) 600 MG extended release tablet Take 1 tablet by mouth 2 times daily  Qty: 60 tablet, Refills: 0      azithromycin (ZITHROMAX) 500 MG tablet Take 1 tablet by mouth daily for 1 day  Qty: 1 tablet, Refills: 0      pantoprazole (PROTONIX) 40 MG tablet Take 1 tablet by mouth every morning (before breakfast)  Qty: 30 tablet, Refills: 0           Mucinex was not covered and told patient to get it over the counter. Additional Documentation:    Placed scripts in patients red bag with other belongings. Patient was aware.

## 2023-08-17 NOTE — DISCHARGE SUMMARY
[address]    DEPARTMENT OF HOSPITALIST MEDICINE      DISCHARGE SUMMARY:      PATIENT NAME:  Carmencita Mason  :  1932  MRN:  [id number]    Admission Date:   8/15/2023  4:56 PM Attending: Fely Shea MD   Discharge Date:   2023              PCP: John Kim MD  Length of Stay: 2 days     Chief Complaint on Admission:   Chief Complaint   Patient presents with    Shortness of Breath     Worsening SOB, recent admission for similar, 2L O2 at baseline    Cough     Chronic, unable to get phlegm out       Consultants:     IP CONSULT TO ONCOLOGY  IP CONSULT TO PALLIATIVE CARE  IP CONSULT  [address]  TREATMENT:  Hospital Summary:   The patient is a 80 y.o. female who presented to [facility] ED for evalaution of shortness of breath. Pt has history of copd requiring supplemental oxygen at home of 2L per nc, anemia, ITP, AAA and hypertension. Pt was discharged from this facility 2023 having had evaluation of progressive shortness of breath and cough over past month. She was evaluated had CT of chest with findings concerning for pulmonary artery hypertension and stable right sided volume loss . She was evaluated by Dr. Winnie Reaves for multiple pulmonary nodules bilaterally concerning for metastatic process of unknown primary site. She relates that she has had continued similar problems over past week. She denies fevers, vomiting as well as chest pain. She is a vague historian with history of present illness obtained from ED physician and review of electronic medical record. In ED, concern for copd exacerbation, wbc 6.6, hgb 9.9, platelets 983Y, resp pcr panel negative, cxr-Cardiomegaly and atherosclerotic disease. Hyperinflation. There is diffuse, chronic appearing interstitial accentuation. No acute infiltrates or consolidated pneumonia. No pleural fluid or pneumothorax.  Sodium 141, potassium 5.3, creatinine 1.1, bun 22, glucose 115, bnp amLODIPine 5 MG tablet  Commonly known as: NORVASC     carboxymethylcellulose 0.5 % Soln ophthalmic solution  Commonly known as: REFRESH PLUS     clonazePAM 0.5 MG tablet  Commonly known as: KLONOPIN     docusate sodium 100 MG capsule  Commonly known as: COLACE  Take 1 capsule by mouth 2 times daily     ferrous sulfate 325 (65 Fe) MG EC tablet  Commonly known as: Fe Tabs  Take 1 tablet by mouth 2 times daily     ipratropium 0.5 mg-albuterol 2.5 mg 0.5-2.5 (3) MG/3ML Soln nebulizer solution  Commonly known as: DUONEB  Inhale 3 mLs into the lungs every 4 hours (while awake)     lidocaine 5 %  Commonly known as: Lidoderm  Place 1 patch onto the skin daily 12 hours on, 12 hours off.     simvastatin 20 MG tablet  Commonly known as: ZOCOR     Vitamin D3 125 MCG (5000 UT) Tabs            STOP taking these medications      lisinopril 2.5 MG tablet  Commonly known as: PRINIVIL;ZESTRIL     Spiriva HandiHaler 18 MCG inhalation capsule  Generic drug: tiotropium               Where to Get Your Medications        These medications were sent to [address], [address] [place] [address]      Phone: [phone]   azithromycin 500 MG tablet  guaiFENesin 600 MG extended release tablet  metoprolol succinate 25 MG extended release tablet  pantoprazole 40 MG tablet  predniSONE 20 MG tablet           ISSUES TO BE ADDRESSED AT HOSPITAL FOLLOW-UP VISIT:    Advised patient to follow-up closely with PCP upon discharge for management of chronic medical issues    Condition on Discharge: stable  Discharge Disposition: Home with [address] [place] [address]    Recommended Follow Up:  No follow-up provider specified. Followup Appointments Scheduled at Time of Discharge:  No future appointments. Discharge Instructions:   Please see the discharge paperwork. Patient was seen at bedside today, and the examination shows improvement since yesterday.     Diet Instructions:  ADULT

## 2023-08-17 NOTE — PROGRESS NOTES
Palliative Care Progress Note  8/17/2023 1:09 PM    Patient:  Sammie Oconnor  YOB: 1932  Primary Care Physician: Ari Coates MD  Advance Directive: DNR  Admit Date: 8/15/2023       Hospital Day: 2  Portions of this note have been copied forward, however, changed to reflect the most current clinical status of this patient. CHIEF COMPLAINT/REASON FOR CONSULTATION Goals of care, family support, Code status, symptom management     SUBJECTIVE: Ms. Vanessa Badna continues to complain of productive cough. She denies pain. HPI: The patient is a 80 y.o. female with PMH AAA, CHF, COPD dependent on 2 L nasal cannula O2, ITP, HTN, malnutrition who presented to [facility] ED on 08/15/2023 complaining of shortness of breath associated with worsening productive cough, weakness, and loss of appetite. Chest x-ray reported diffuse, chronic appearing interstitial attenuation with cardiomegaly and atherosclerotic disease. She was tachycardic on arrival and EKG revealed atrial fibrillation with rapid ventricular response. This was reported to improve with IV fluids. She was admitted to hospitalist service with concern for COPD exacerbation and was placed on Solu-Medrol, nebulizer treatments, and azithromycin. Respiratory viral panel was unremarkable. Troponin was 0.05 and has remained flat. BNP is 1035. Palliative care was consulted for goals of care. Ms. Vanessa Banda was last seen by this provider on 8/11/2023. During that hospitalization she was found to have numerous bilateral solid and groundglass nodules. This was suspected to be from a metastatic process. This was discussed with her and she declined further cancer work-up stating that she would not be seeking treatment. She was discharged home with [facility] and case management help to assist set up with home health, active day, Meals on Wheels, and a new nebulizer.   Hospice care was discussed with her during that admission and she lives at home independently and recommended 24-hour supervision/assist.  Discussed this with Ara Marks -hospice services cannot admit patient at home due to concern for her safety. I had a lengthy conversation with Ms. John about safety concerns if she discharges home alone. I reviewed physical therapy notes with her. We reviewed home health and home hospice services and I again told her that neither services have someone in the home 24/7 or even daily. She is unable to prepare her own meals or perform even light housekeeping. She is frail and can ambulate short distances with a walker. She is fully alert and oriented and has capacity to make her own decisions at this time. She is able to state understanding of poor prognosis associated with suspected lung cancer. She tells me that it does not matter if she had a very short time or a very long time to live that she does not want to give up her apartment. 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 She does not want to further burden her son with caring for her due to him caring for his grandchildren and a great grandchild. She adamantly refuses nursing facility placement with hospice at this time she is receptive to hospice however due to safety concerns they are unable to admit at the time of discharge. Home health will be set up according to case management. Hospice will follow closely. I called the patient's son this afternoon and we had a lengthy discussion regarding her diagnosis and prognosis and concern for safety. We reviewed physical therapy notes and he tells me he was able to talk to her this morning and felt that she would refuse placement as well. He states that she is her own decision maker and he cannot force her to choose anything different. He states there is no one available who can check on her daily.   He states that if she gets home and changes her mind

## 2023-08-17 NOTE — PLAN OF CARE
Problem: Discharge Planning  Goal: Discharge to home or other facility with appropriate resources  Outcome: Progressing  Flowsheets (Taken 8/16/2023 1930)  Discharge to home or other facility with appropriate resources: Identify barriers to discharge with patient and caregiver     Problem: Safety - Adult  Goal: Free from fall injury  Outcome: Progressing     Problem: Chronic Conditions and Co-morbidities  Goal: Patient's chronic conditions and co-morbidity symptoms are monitored and maintained or improved  Recent Flowsheet Documentation  Taken 8/16/2023 1930 by Dav Leonardo [facility] - Patient's Chronic Conditions and Co-Morbidity Symptoms are Monitored and Maintained or Improved: Monitor and assess patient's chronic conditions and comorbid symptoms for stability, deterioration, or improvement

## 2023-08-17 NOTE — CARE COORDINATION
Home Health Resumption of Care orders received. [facility] has been notified and orders faxed.     Electronically signed by Lisa Mccray RN on 8/17/2023 at 3:10 PM

## 2023-08-17 NOTE — PROGRESS NOTES
Physical Therapy  Facility/Department: [facility] 3 KEVIN/VAS/MED  Physical Therapy Initial Assessment    Name: Thea Kelley  : 1932  MRN: [id number]  Date of Service: 2023    Discharge Recommendations:  Continue to assess pending progress, 24 hour supervision or assist, Patient would benefit from continued therapy after discharge          Patient Diagnosis(es): The primary encounter diagnosis was Atrial fibrillation with rapid ventricular response ([address]). Diagnoses of Shortness of breath, COPD exacerbation ([address]), and Chronic cough were also pertinent to this visit. Past Medical History:  has a past medical history of Abdominal aortic aneurysm (AAA) ([address]), Anemia, Anxiety, Arthritis, CHF (congestive heart failure) ([address]), COPD (chronic obstructive pulmonary disease) ([address]), Depression, H/O idiopathic thrombocytopenic purpura, Hypertension, and Migraines. Past Surgical History:  has a past surgical history that includes Total abdominal hysterectomy w/ bilateral salpingoophorectomy; bone marrow biopsy; and Colonoscopy (2013). Assessment   Body Structures, Functions, Activity Limitations Requiring Skilled Therapeutic Intervention: Decreased functional mobility ; Decreased balance;Decreased endurance  Assessment: Pt. will benefit from cont. PT to decrease impairments. Pt. a fall risk and should not attempt mobility on her own at this time. Anticipate pt will need 24 hr care. She is deconditioned at this time. She would have difficulty taking care of herself at home. Encouraged to sit up in chair and will work on progressive mobility.   Treatment Diagnosis: impaired gait and mobility  Therapy Prognosis: Good  Decision Making: Low Complexity  Barriers to Learning: cognition  Requires PT Follow-Up: Yes  Activity Tolerance  Activity Tolerance: Patient tolerated treatment well     Plan   Physcial Therapy Plan  General Plan: 6-7 times per week  Therapy Duration: 2 Weeks  Current Treatment Recommendations: Strengthening, SBA  Patient Goals   Patient Goals : go home       Education  Patient Education  Education Given To: Patient  Education Provided: Role of Therapy;Plan of Care  Education Provided Comments: use of call light, up to chair  Education Method: Verbal  Barriers to Learning: Cognition; Hearing  Education Outcome: Verbalized understanding;Demonstrated understanding;Continued education needed      Therapy Time   Individual Concurrent Group Co-treatment   Time In           Time Out           Minutes                   Sarah Haley PT   Electronically signed by Sarah Haley PT on 8/17/2023 at 11:20 AM

## 2023-08-17 NOTE — DISCHARGE INSTR - DIET

## 2023-08-21 NOTE — PROGRESS NOTES
Physician Progress Note      Chris Oliver  [facility] #:                  [id number]  :                       1932  ADMIT DATE:       8/10/2023 1:43 PM  [facility] DATE:        2023 3:50 PM  RESPONDING  PROVIDER #:        Angy Kwong          QUERY TEXT:    Pt admitted with Generalized Weakness, shortness of breath. Pt noted to have   past medical history of ITP and found to have an abnormal chest CT with    concern for metastatic disease to both lungs. If possible, please document in   progress notes and discharge summary the relationship, if any, between   generalized weakness and the cause: The medical record reflects the following:  Risk Factors: Past Medical History of ITP,  Clinical Indicators: Pt. presented for generalized weakness and shortness of   breath. Pt. was found to have multiple pulmonary nodules bilaterally   concerning for metastatic process of unknown primary site. Treatment: CT Chest, ECHO, Palliative Care    Danyel Rabago, RN-BSN, [facility]  Clinical   [email or web address]  [facility]  Options provided:  -- Generalized Weakness due to metastatic disease of the lungs  -- Generalized weakness due to Age Related Debility  -- Generalized Weakness due to unknown etiology  -- Other - I will add my own diagnosis  -- Disagree - Not applicable / Not valid  -- Disagree - Clinically unable to determine / Unknown  -- Refer to Clinical Documentation Reviewer    PROVIDER RESPONSE TEXT:    This patient has generalized weakness due to metastatic disease of the lungs.     Query created by: Rodri Larsen on 2023 4:53 PM      Electronically signed by:  Angy Kwong 2023 8:42 AM

## 2023-08-31 ENCOUNTER — APPOINTMENT (OUTPATIENT)
Dept: GENERAL RADIOLOGY | Age: 88
DRG: 308 | End: 2023-08-31
Payer: MEDICARE

## 2023-08-31 ENCOUNTER — HOSPITAL ENCOUNTER (INPATIENT)
Age: 88
LOS: 8 days | Discharge: NURSING, REHAB OR LONG-TERM CARE | DRG: 308 | End: 2023-09-08
Attending: EMERGENCY MEDICINE
Payer: MEDICARE

## 2023-08-31 ENCOUNTER — APPOINTMENT (OUTPATIENT)
Dept: CT IMAGING | Age: 88
DRG: 308 | End: 2023-08-31
Payer: MEDICARE

## 2023-08-31 DIAGNOSIS — R07.9 CHEST PAIN, UNSPECIFIED TYPE: ICD-10-CM

## 2023-08-31 DIAGNOSIS — G47.09 OTHER INSOMNIA: Primary | ICD-10-CM

## 2023-08-31 DIAGNOSIS — I48.91 ATRIAL FIBRILLATION WITH RVR (HCC): ICD-10-CM

## 2023-08-31 DIAGNOSIS — J44.9 CHRONIC OBSTRUCTIVE PULMONARY DISEASE, UNSPECIFIED COPD TYPE (HCC): ICD-10-CM

## 2023-08-31 PROBLEM — R62.7 FAILURE TO THRIVE IN ADULT: Status: ACTIVE | Noted: 2023-08-31

## 2023-08-31 LAB
ALBUMIN SERPL-MCNC: 3.5 G/DL (ref 3.5–5.2)
ALP SERPL-CCNC: 65 U/L (ref 35–104)
ALT SERPL-CCNC: 8 U/L (ref 5–33)
ANION GAP SERPL CALCULATED.3IONS-SCNC: 10 MMOL/L (ref 7–19)
AST SERPL-CCNC: 16 U/L (ref 5–32)
B PARAP IS1001 DNA NPH QL NAA+NON-PROBE: NOT DETECTED
B PERT.PT PRMT NPH QL NAA+NON-PROBE: NOT DETECTED
BASOPHILS # BLD: 0 K/UL (ref 0–0.2)
BASOPHILS NFR BLD: 0.4 % (ref 0–1)
BILIRUB SERPL-MCNC: 0.4 MG/DL (ref 0.2–1.2)
BNP BLD-MCNC: 7770 PG/ML (ref 0–449)
BUN SERPL-MCNC: 23 MG/DL (ref 8–23)
C PNEUM DNA NPH QL NAA+NON-PROBE: NOT DETECTED
CALCIUM SERPL-MCNC: 9.2 MG/DL (ref 8.2–9.6)
CHLORIDE SERPL-SCNC: 96 MMOL/L (ref 98–111)
CO2 SERPL-SCNC: 36 MMOL/L (ref 22–29)
CREAT SERPL-MCNC: 0.9 MG/DL (ref 0.5–0.9)
D DIMER PPP FEU-MCNC: 1.22 UG/ML FEU (ref 0–0.48)
EOSINOPHIL # BLD: 0 K/UL (ref 0–0.6)
EOSINOPHIL NFR BLD: 0.2 % (ref 0–5)
ERYTHROCYTE [DISTWIDTH] IN BLOOD BY AUTOMATED COUNT: 14.3 % (ref 11.5–14.5)
FLUAV RNA NPH QL NAA+NON-PROBE: NOT DETECTED
FLUBV RNA NPH QL NAA+NON-PROBE: NOT DETECTED
GLUCOSE SERPL-MCNC: 90 MG/DL (ref 74–109)
HADV DNA NPH QL NAA+NON-PROBE: NOT DETECTED
HCOV 229E RNA NPH QL NAA+NON-PROBE: NOT DETECTED
HCOV HKU1 RNA NPH QL NAA+NON-PROBE: NOT DETECTED
HCOV NL63 RNA NPH QL NAA+NON-PROBE: NOT DETECTED
HCOV OC43 RNA NPH QL NAA+NON-PROBE: NOT DETECTED
HCT VFR BLD AUTO: 31.6 % (ref 37–47)
HGB BLD-MCNC: 8.7 G/DL (ref 12–16)
HMPV RNA NPH QL NAA+NON-PROBE: NOT DETECTED
HPIV1 RNA NPH QL NAA+NON-PROBE: NOT DETECTED
HPIV2 RNA NPH QL NAA+NON-PROBE: NOT DETECTED
HPIV3 RNA NPH QL NAA+NON-PROBE: NOT DETECTED
HPIV4 RNA NPH QL NAA+NON-PROBE: NOT DETECTED
HYPOCHROMIA BLD QL SMEAR: ABNORMAL
IMM GRANULOCYTES # BLD: 0 K/UL
LYMPHOCYTES # BLD: 0.4 K/UL (ref 1.1–4.5)
LYMPHOCYTES NFR BLD: 4.2 % (ref 20–40)
M PNEUMO DNA NPH QL NAA+NON-PROBE: NOT DETECTED
MAGNESIUM SERPL-MCNC: 2 MG/DL (ref 1.7–2.3)
MCH RBC QN AUTO: 26.6 PG (ref 27–31)
MCHC RBC AUTO-ENTMCNC: 27.5 G/DL (ref 33–37)
MCV RBC AUTO: 96.6 FL (ref 81–99)
MONOCYTES # BLD: 0.8 K/UL (ref 0–0.9)
MONOCYTES NFR BLD: 7.9 % (ref 0–10)
NEUTROPHILS # BLD: 8.6 K/UL (ref 1.5–7.5)
NEUTS SEG NFR BLD: 86.9 % (ref 50–65)
OVALOCYTES BLD QL SMEAR: ABNORMAL
PLATELET # BLD AUTO: 271 K/UL (ref 130–400)
PLATELET SLIDE REVIEW: ADEQUATE
PMV BLD AUTO: 9.6 FL (ref 9.4–12.3)
POLYCHROMASIA BLD QL SMEAR: ABNORMAL
POTASSIUM SERPL-SCNC: 4.9 MMOL/L (ref 3.5–5)
PROT SERPL-MCNC: 6.1 G/DL (ref 6.6–8.7)
RBC # BLD AUTO: 3.27 M/UL (ref 4.2–5.4)
RSV RNA NPH QL NAA+NON-PROBE: NOT DETECTED
RV+EV RNA NPH QL NAA+NON-PROBE: NOT DETECTED
SARS-COV-2 RNA NPH QL NAA+NON-PROBE: NOT DETECTED
SODIUM SERPL-SCNC: 142 MMOL/L (ref 136–145)
TROPONIN T SERPL-MCNC: 0.01 NG/ML (ref 0–0.03)
WBC # BLD AUTO: 9.9 K/UL (ref 4.8–10.8)

## 2023-08-31 PROCEDURE — 83880 ASSAY OF NATRIURETIC PEPTIDE: CPT

## 2023-08-31 PROCEDURE — 36415 COLL VENOUS BLD VENIPUNCTURE: CPT

## 2023-08-31 PROCEDURE — 83540 ASSAY OF IRON: CPT

## 2023-08-31 PROCEDURE — 99285 EMERGENCY DEPT VISIT HI MDM: CPT

## 2023-08-31 PROCEDURE — 84484 ASSAY OF TROPONIN QUANT: CPT

## 2023-08-31 PROCEDURE — 83735 ASSAY OF MAGNESIUM: CPT

## 2023-08-31 PROCEDURE — 2140000000 HC CCU INTERMEDIATE R&B

## 2023-08-31 PROCEDURE — 6370000000 HC RX 637 (ALT 250 FOR IP): Performed by: NURSE PRACTITIONER

## 2023-08-31 PROCEDURE — 6360000004 HC RX CONTRAST MEDICATION: Performed by: EMERGENCY MEDICINE

## 2023-08-31 PROCEDURE — 93005 ELECTROCARDIOGRAM TRACING: CPT

## 2023-08-31 PROCEDURE — 2500000003 HC RX 250 WO HCPCS: Performed by: EMERGENCY MEDICINE

## 2023-08-31 PROCEDURE — 82746 ASSAY OF FOLIC ACID SERUM: CPT

## 2023-08-31 PROCEDURE — 71045 X-RAY EXAM CHEST 1 VIEW: CPT

## 2023-08-31 PROCEDURE — 87040 BLOOD CULTURE FOR BACTERIA: CPT

## 2023-08-31 PROCEDURE — 84443 ASSAY THYROID STIM HORMONE: CPT

## 2023-08-31 PROCEDURE — 83615 LACTATE (LD) (LDH) ENZYME: CPT

## 2023-08-31 PROCEDURE — 6360000002 HC RX W HCPCS: Performed by: EMERGENCY MEDICINE

## 2023-08-31 PROCEDURE — 82728 ASSAY OF FERRITIN: CPT

## 2023-08-31 PROCEDURE — 80053 COMPREHEN METABOLIC PANEL: CPT

## 2023-08-31 PROCEDURE — 0202U NFCT DS 22 TRGT SARS-COV-2: CPT

## 2023-08-31 PROCEDURE — 83550 IRON BINDING TEST: CPT

## 2023-08-31 PROCEDURE — 82607 VITAMIN B-12: CPT

## 2023-08-31 PROCEDURE — 2580000003 HC RX 258: Performed by: EMERGENCY MEDICINE

## 2023-08-31 PROCEDURE — 85025 COMPLETE CBC W/AUTO DIFF WBC: CPT

## 2023-08-31 PROCEDURE — 85379 FIBRIN DEGRADATION QUANT: CPT

## 2023-08-31 PROCEDURE — 6370000000 HC RX 637 (ALT 250 FOR IP): Performed by: EMERGENCY MEDICINE

## 2023-08-31 PROCEDURE — 71275 CT ANGIOGRAPHY CHEST: CPT

## 2023-08-31 PROCEDURE — 83010 ASSAY OF HAPTOGLOBIN QUANT: CPT

## 2023-08-31 RX ORDER — SODIUM CHLORIDE 0.9 % (FLUSH) 0.9 %
5-40 SYRINGE (ML) INJECTION EVERY 12 HOURS SCHEDULED
Status: DISCONTINUED | OUTPATIENT
Start: 2023-08-31 | End: 2023-09-08 | Stop reason: HOSPADM

## 2023-08-31 RX ORDER — ONDANSETRON 4 MG/1
4 TABLET, ORALLY DISINTEGRATING ORAL EVERY 8 HOURS PRN
Status: DISCONTINUED | OUTPATIENT
Start: 2023-08-31 | End: 2023-09-08 | Stop reason: HOSPADM

## 2023-08-31 RX ORDER — IPRATROPIUM BROMIDE AND ALBUTEROL SULFATE 2.5; .5 MG/3ML; MG/3ML
1 SOLUTION RESPIRATORY (INHALATION)
Status: DISCONTINUED | OUTPATIENT
Start: 2023-09-01 | End: 2023-09-08 | Stop reason: HOSPADM

## 2023-08-31 RX ORDER — FUROSEMIDE 10 MG/ML
20 INJECTION INTRAMUSCULAR; INTRAVENOUS ONCE
Status: COMPLETED | OUTPATIENT
Start: 2023-08-31 | End: 2023-08-31

## 2023-08-31 RX ORDER — ATORVASTATIN CALCIUM 10 MG/1
10 TABLET, FILM COATED ORAL DAILY
Status: DISCONTINUED | OUTPATIENT
Start: 2023-09-01 | End: 2023-09-08 | Stop reason: HOSPADM

## 2023-08-31 RX ORDER — ONDANSETRON 2 MG/ML
4 INJECTION INTRAMUSCULAR; INTRAVENOUS EVERY 6 HOURS PRN
Status: DISCONTINUED | OUTPATIENT
Start: 2023-08-31 | End: 2023-09-08 | Stop reason: HOSPADM

## 2023-08-31 RX ORDER — SODIUM CHLORIDE 9 MG/ML
INJECTION, SOLUTION INTRAVENOUS PRN
Status: DISCONTINUED | OUTPATIENT
Start: 2023-08-31 | End: 2023-09-08 | Stop reason: HOSPADM

## 2023-08-31 RX ORDER — ASPIRIN 81 MG/1
324 TABLET, CHEWABLE ORAL ONCE
Status: COMPLETED | OUTPATIENT
Start: 2023-08-31 | End: 2023-08-31

## 2023-08-31 RX ORDER — AMLODIPINE BESYLATE 5 MG/1
5 TABLET ORAL DAILY
Status: CANCELLED | OUTPATIENT
Start: 2023-08-31

## 2023-08-31 RX ORDER — ACETAMINOPHEN 650 MG/1
650 SUPPOSITORY RECTAL EVERY 6 HOURS PRN
Status: DISCONTINUED | OUTPATIENT
Start: 2023-08-31 | End: 2023-09-08 | Stop reason: HOSPADM

## 2023-08-31 RX ORDER — ALBUTEROL SULFATE 90 UG/1
2 AEROSOL, METERED RESPIRATORY (INHALATION) EVERY 4 HOURS PRN
Status: DISCONTINUED | OUTPATIENT
Start: 2023-08-31 | End: 2023-09-08 | Stop reason: HOSPADM

## 2023-08-31 RX ORDER — PANTOPRAZOLE SODIUM 40 MG/1
40 TABLET, DELAYED RELEASE ORAL
Status: DISCONTINUED | OUTPATIENT
Start: 2023-09-01 | End: 2023-09-08 | Stop reason: HOSPADM

## 2023-08-31 RX ORDER — POLYETHYLENE GLYCOL 3350 17 G/17G
17 POWDER, FOR SOLUTION ORAL DAILY PRN
Status: DISCONTINUED | OUTPATIENT
Start: 2023-08-31 | End: 2023-09-08 | Stop reason: HOSPADM

## 2023-08-31 RX ORDER — SODIUM CHLORIDE 0.9 % (FLUSH) 0.9 %
5-40 SYRINGE (ML) INJECTION PRN
Status: DISCONTINUED | OUTPATIENT
Start: 2023-08-31 | End: 2023-09-08 | Stop reason: HOSPADM

## 2023-08-31 RX ORDER — ASPIRIN 300 MG/1
300 SUPPOSITORY RECTAL ONCE
Status: COMPLETED | OUTPATIENT
Start: 2023-08-31 | End: 2023-08-31

## 2023-08-31 RX ORDER — CLONAZEPAM 0.5 MG/1
0.5 TABLET ORAL NIGHTLY
Status: DISCONTINUED | OUTPATIENT
Start: 2023-08-31 | End: 2023-09-08 | Stop reason: HOSPADM

## 2023-08-31 RX ORDER — FERROUS SULFATE 325(65) MG
325 TABLET ORAL 2 TIMES DAILY
Status: DISCONTINUED | OUTPATIENT
Start: 2023-08-31 | End: 2023-09-08 | Stop reason: HOSPADM

## 2023-08-31 RX ORDER — ACETAMINOPHEN 325 MG/1
650 TABLET ORAL EVERY 6 HOURS PRN
Status: DISCONTINUED | OUTPATIENT
Start: 2023-08-31 | End: 2023-09-08 | Stop reason: HOSPADM

## 2023-08-31 RX ORDER — GUAIFENESIN 600 MG/1
600 TABLET, EXTENDED RELEASE ORAL 2 TIMES DAILY
Status: DISCONTINUED | OUTPATIENT
Start: 2023-08-31 | End: 2023-09-08 | Stop reason: HOSPADM

## 2023-08-31 RX ORDER — DOCUSATE SODIUM 100 MG/1
100 CAPSULE, LIQUID FILLED ORAL 2 TIMES DAILY
Status: DISCONTINUED | OUTPATIENT
Start: 2023-08-31 | End: 2023-09-08 | Stop reason: HOSPADM

## 2023-08-31 RX ORDER — METOPROLOL SUCCINATE 25 MG/1
25 TABLET, EXTENDED RELEASE ORAL DAILY
Status: DISCONTINUED | OUTPATIENT
Start: 2023-09-01 | End: 2023-09-01

## 2023-08-31 RX ADMIN — CLONAZEPAM 0.5 MG: 0.5 TABLET ORAL at 22:14

## 2023-08-31 RX ADMIN — GUAIFENESIN 600 MG: 600 TABLET ORAL at 22:14

## 2023-08-31 RX ADMIN — ASPIRIN 300 MG: 300 SUPPOSITORY RECTAL at 19:32

## 2023-08-31 RX ADMIN — DOCUSATE SODIUM 100 MG: 100 CAPSULE, LIQUID FILLED ORAL at 22:14

## 2023-08-31 RX ADMIN — DILTIAZEM HYDROCHLORIDE 5 MG/HR: 5 INJECTION, SOLUTION INTRAVENOUS at 16:07

## 2023-08-31 RX ADMIN — ASPIRIN 81 MG: 81 TABLET, CHEWABLE ORAL at 16:11

## 2023-08-31 RX ADMIN — IOPAMIDOL 70 ML: 755 INJECTION, SOLUTION INTRAVENOUS at 17:14

## 2023-08-31 RX ADMIN — FUROSEMIDE 20 MG: 10 INJECTION, SOLUTION INTRAMUSCULAR; INTRAVENOUS at 19:32

## 2023-08-31 RX ADMIN — FERROUS SULFATE TAB 325 MG (65 MG ELEMENTAL FE) 325 MG: 325 (65 FE) TAB at 22:14

## 2023-08-31 SDOH — ECONOMIC STABILITY: INCOME INSECURITY: IN THE PAST 12 MONTHS, HAS THE ELECTRIC, GAS, OIL, OR WATER COMPANY THREATENED TO SHUT OFF SERVICE IN YOUR HOME?: NO

## 2023-08-31 SDOH — ECONOMIC STABILITY: INCOME INSECURITY: HOW HARD IS IT FOR YOU TO PAY FOR THE VERY BASICS LIKE FOOD, HOUSING, MEDICAL CARE, AND HEATING?: SOMEWHAT HARD

## 2023-08-31 SDOH — ECONOMIC STABILITY: FOOD INSECURITY: WITHIN THE PAST 12 MONTHS, YOU WORRIED THAT YOUR FOOD WOULD RUN OUT BEFORE YOU GOT MONEY TO BUY MORE.: SOMETIMES TRUE

## 2023-08-31 ASSESSMENT — ENCOUNTER SYMPTOMS: SHORTNESS OF BREATH: 1

## 2023-08-31 ASSESSMENT — LIFESTYLE VARIABLES
HOW MANY STANDARD DRINKS CONTAINING ALCOHOL DO YOU HAVE ON A TYPICAL DAY: PATIENT DOES NOT DRINK
HOW OFTEN DO YOU HAVE A DRINK CONTAINING ALCOHOL: NEVER

## 2023-08-31 ASSESSMENT — PATIENT HEALTH QUESTIONNAIRE - PHQ9
SUM OF ALL RESPONSES TO PHQ9 QUESTIONS 1 & 2: 3
SUM OF ALL RESPONSES TO PHQ QUESTIONS 1-9: 3
1. LITTLE INTEREST OR PLEASURE IN DOING THINGS: 2
SUM OF ALL RESPONSES TO PHQ QUESTIONS 1-9: 3
SUM OF ALL RESPONSES TO PHQ QUESTIONS 1-9: 3
2. FEELING DOWN, DEPRESSED OR HOPELESS: 1
SUM OF ALL RESPONSES TO PHQ QUESTIONS 1-9: 3

## 2023-08-31 ASSESSMENT — PAIN - FUNCTIONAL ASSESSMENT: PAIN_FUNCTIONAL_ASSESSMENT: NONE - DENIES PAIN

## 2023-08-31 NOTE — H&P
[facility] Hospitalists      Hospitalist - History & Physical      PCP: Venus Trejo MD    Date of Admission: 8/31/2023    Date of Service: 8/31/2023    Chief Complaint:  Shortness of breath    History Of Present Illness: The patient is a 80 y.o. female who presented to [facility] ED for evaluation of shortness of breath. Pt has history of multiple pulmonary nodules bilaterally concern for metastatic process of unknown site, atrial fibrillation, copd requiring supplemental oxygen at 2L per nc at home, chf, HTN and iron deficiency anemia. Pt reported that she is a hospice patient requesting medical treatment. She was last discharged from this facility on 8/17/2023 having had evaluation and treatment of copd exacerbation. She was discharged from this facility on 8/211/2023  having had evaluation of progressive shortness of breath and cough over past month. She was evaluated had CT of chest with findings concerning for pulmonary artery hypertension and stable right sided volume loss     She relates that she has had continued problems with shortness of breath since discharge. EMS had reports pt with hypoxia with spo2 of 85% en route on normal 2L of supplemental oxygen improved to 91% after duoneb treatment. Pt tells me that she can no longer take care of herselft at home and wants to go to nursing home. She has had home health since discharge. She had reported chest pain earlier in ED however denies this to me. In ED, afib w/rvr treated with diltiazem infusion, lasix 20mg iv given, cxr-Small right pleural effusion with adjacent consolidation. Magnesium 2.0, sodium 142, potassium 4.9, creatinine 0.9/bun 23, bnp 7.770, troponin 0.01, respiratory pcr panel negative, wbc 9.9k, hgb 8.7, plateltes 024O, ddimer 1.22, CTA pulmonary result pending.  Pt is admitted inpatient to hospitalist.     Past Medical History:        Diagnosis Date    Abdominal aortic aneurysm (AAA) (HCC)     Anemia     iron deficiecy    Anxiety

## 2023-09-01 LAB
ANION GAP SERPL CALCULATED.3IONS-SCNC: 10 MMOL/L (ref 7–19)
BUN SERPL-MCNC: 23 MG/DL (ref 8–23)
CALCIUM SERPL-MCNC: 9.2 MG/DL (ref 8.2–9.6)
CHLORIDE SERPL-SCNC: 93 MMOL/L (ref 98–111)
CO2 SERPL-SCNC: 36 MMOL/L (ref 22–29)
CREAT SERPL-MCNC: 1 MG/DL (ref 0.5–0.9)
ERYTHROCYTE [DISTWIDTH] IN BLOOD BY AUTOMATED COUNT: 14.5 % (ref 11.5–14.5)
FERRITIN SERPL-MCNC: 35 NG/ML (ref 13–150)
FOLATE SERPL-MCNC: >20 NG/ML (ref 4.8–37.3)
GLUCOSE SERPL-MCNC: 133 MG/DL (ref 74–109)
HAPTOGLOB SERPL-MCNC: 209 MG/DL (ref 30–200)
HCT VFR BLD AUTO: 33.7 % (ref 37–47)
HGB BLD-MCNC: 9.5 G/DL (ref 12–16)
IRON SATN MFR SERPL: 6 % (ref 14–50)
IRON SERPL-MCNC: 20 UG/DL (ref 37–145)
LDH SERPL-CCNC: 210 U/L (ref 91–215)
MCH RBC QN AUTO: 26.3 PG (ref 27–31)
MCHC RBC AUTO-ENTMCNC: 28.2 G/DL (ref 33–37)
MCV RBC AUTO: 93.4 FL (ref 81–99)
PLATELET # BLD AUTO: 275 K/UL (ref 130–400)
PMV BLD AUTO: 9.5 FL (ref 9.4–12.3)
POTASSIUM SERPL-SCNC: 4.6 MMOL/L (ref 3.5–5)
RBC # BLD AUTO: 3.61 M/UL (ref 4.2–5.4)
SODIUM SERPL-SCNC: 139 MMOL/L (ref 136–145)
TIBC SERPL-MCNC: 316 UG/DL (ref 250–400)
TSH SERPL DL<=0.005 MIU/L-ACNC: 1.65 UIU/ML (ref 0.27–4.2)
VIT B12 SERPL-MCNC: 789 PG/ML (ref 211–946)
WBC # BLD AUTO: 8.1 K/UL (ref 4.8–10.8)

## 2023-09-01 PROCEDURE — 6370000000 HC RX 637 (ALT 250 FOR IP): Performed by: NURSE PRACTITIONER

## 2023-09-01 PROCEDURE — 36415 COLL VENOUS BLD VENIPUNCTURE: CPT

## 2023-09-01 PROCEDURE — 94760 N-INVAS EAR/PLS OXIMETRY 1: CPT

## 2023-09-01 PROCEDURE — 94640 AIRWAY INHALATION TREATMENT: CPT

## 2023-09-01 PROCEDURE — 99223 1ST HOSP IP/OBS HIGH 75: CPT | Performed by: PHYSICIAN ASSISTANT

## 2023-09-01 PROCEDURE — 6370000000 HC RX 637 (ALT 250 FOR IP): Performed by: PHYSICIAN ASSISTANT

## 2023-09-01 PROCEDURE — 2140000000 HC CCU INTERMEDIATE R&B

## 2023-09-01 PROCEDURE — 85027 COMPLETE CBC AUTOMATED: CPT

## 2023-09-01 PROCEDURE — 2700000000 HC OXYGEN THERAPY PER DAY

## 2023-09-01 PROCEDURE — 2580000003 HC RX 258: Performed by: NURSE PRACTITIONER

## 2023-09-01 PROCEDURE — 80048 BASIC METABOLIC PNL TOTAL CA: CPT

## 2023-09-01 RX ORDER — MEGESTROL ACETATE 40 MG/ML
200 SUSPENSION ORAL 2 TIMES DAILY
Status: DISCONTINUED | OUTPATIENT
Start: 2023-09-01 | End: 2023-09-08 | Stop reason: HOSPADM

## 2023-09-01 RX ORDER — DILTIAZEM HYDROCHLORIDE 120 MG/1
120 CAPSULE, COATED, EXTENDED RELEASE ORAL DAILY
Status: DISCONTINUED | OUTPATIENT
Start: 2023-09-02 | End: 2023-09-04

## 2023-09-01 RX ORDER — METOPROLOL SUCCINATE 50 MG/1
50 TABLET, EXTENDED RELEASE ORAL DAILY
Status: DISCONTINUED | OUTPATIENT
Start: 2023-09-02 | End: 2023-09-01

## 2023-09-01 RX ORDER — MORPHINE SULFATE 20 MG/ML
2.5 SOLUTION ORAL EVERY 4 HOURS PRN
Status: DISCONTINUED | OUTPATIENT
Start: 2023-09-01 | End: 2023-09-08 | Stop reason: HOSPADM

## 2023-09-01 RX ADMIN — SODIUM CHLORIDE, PRESERVATIVE FREE 10 ML: 5 INJECTION INTRAVENOUS at 22:14

## 2023-09-01 RX ADMIN — IPRATROPIUM BROMIDE AND ALBUTEROL SULFATE 1 DOSE: 2.5; .5 SOLUTION RESPIRATORY (INHALATION) at 07:46

## 2023-09-01 RX ADMIN — FERROUS SULFATE TAB 325 MG (65 MG ELEMENTAL FE) 325 MG: 325 (65 FE) TAB at 09:08

## 2023-09-01 RX ADMIN — FERROUS SULFATE TAB 325 MG (65 MG ELEMENTAL FE) 325 MG: 325 (65 FE) TAB at 22:13

## 2023-09-01 RX ADMIN — MEGESTROL ACETATE 200 MG: 40 SUSPENSION ORAL at 22:25

## 2023-09-01 RX ADMIN — GUAIFENESIN 600 MG: 600 TABLET ORAL at 22:12

## 2023-09-01 RX ADMIN — CLONAZEPAM 0.5 MG: 0.5 TABLET ORAL at 22:12

## 2023-09-01 RX ADMIN — MEGESTROL ACETATE 200 MG: 40 SUSPENSION ORAL at 09:08

## 2023-09-01 RX ADMIN — SODIUM CHLORIDE, PRESERVATIVE FREE 10 ML: 5 INJECTION INTRAVENOUS at 09:09

## 2023-09-01 RX ADMIN — IPRATROPIUM BROMIDE AND ALBUTEROL SULFATE 1 DOSE: 2.5; .5 SOLUTION RESPIRATORY (INHALATION) at 19:45

## 2023-09-01 RX ADMIN — DOCUSATE SODIUM 100 MG: 100 CAPSULE, LIQUID FILLED ORAL at 09:08

## 2023-09-01 RX ADMIN — PANTOPRAZOLE SODIUM 40 MG: 40 TABLET, DELAYED RELEASE ORAL at 05:57

## 2023-09-01 RX ADMIN — METOPROLOL SUCCINATE 25 MG: 25 TABLET, EXTENDED RELEASE ORAL at 09:08

## 2023-09-01 RX ADMIN — GUAIFENESIN 600 MG: 600 TABLET ORAL at 09:08

## 2023-09-01 RX ADMIN — DOCUSATE SODIUM 100 MG: 100 CAPSULE, LIQUID FILLED ORAL at 22:12

## 2023-09-01 RX ADMIN — IPRATROPIUM BROMIDE AND ALBUTEROL SULFATE 1 DOSE: 2.5; .5 SOLUTION RESPIRATORY (INHALATION) at 11:20

## 2023-09-01 RX ADMIN — IPRATROPIUM BROMIDE AND ALBUTEROL SULFATE 1 DOSE: 2.5; .5 SOLUTION RESPIRATORY (INHALATION) at 14:51

## 2023-09-01 RX ADMIN — ATORVASTATIN CALCIUM 10 MG: 10 TABLET, FILM COATED ORAL at 09:08

## 2023-09-01 ASSESSMENT — ENCOUNTER SYMPTOMS
BACK PAIN: 1
DIARRHEA: 0
CONSTIPATION: 0
NAUSEA: 0
ABDOMINAL DISTENTION: 0
SHORTNESS OF BREATH: 1
WHEEZING: 1
ABDOMINAL PAIN: 0
VOMITING: 0

## 2023-09-01 NOTE — PROGRESS NOTES
Comprehensive Nutrition Assessment    Type and Reason for Visit:  Initial, Positive Nutrition Screen    Nutrition Recommendations/Plan:   Dx Severe Malnutrition. Continue Megace. Start ONS BID, update preferences. Malnutrition Assessment:  Malnutrition Status:  Severe malnutrition (09/01/23 1313)    Context:  Chronic Illness     Findings of the 6 clinical characteristics of malnutrition:  Energy Intake:  Mild decrease in energy intake (Comment)  Weight Loss:  Greater than 10% over 6 months     Body Fat Loss:  Severe body fat loss Buccal region, Triceps, Orbital   Muscle Mass Loss:  Severe muscle mass loss Temples (temporalis), Clavicles (pectoralis & deltoids), Hand (interosseous)  Fluid Accumulation:  No significant fluid accumulation     Strength:  Not Performed    Nutrition Assessment:    +NS for pt reported wt loss/decreased appetite. Pt presents severely malnourished AEB severe muscle wasting, severe fat loss, and wt loss 15% in 1 month. Pt is at risk fo further nutritional decline r/t impaired respiratory function (O2 @ 2L) and catabolic illness. She is on Easy to [employer] and doing well with this at lunch meal. She had eaten all of her meat and stated that her appetite is good, but she has difficulty eating much at one time. Preferences obtained. She is agreeable to ONS BID. Megace has been added per Palliative Care. Will add ONS and preferences to diet order and continue to follow. Nutrition Related Findings:      Wound Type: None       Current Nutrition Intake & Therapies:    Average Meal Intake: Unable to assess  Average Supplements Intake: None Ordered  ADULT DIET; Easy to Chew    Anthropometric Measures:  Height: 5' 4\" (162.6 cm)  Ideal Body Weight (IBW): 120 lbs (55 kg)       Current Body Weight: 85 lb 2 oz (38.6 kg), 70.9 % IBW.     Current BMI (kg/m2): 14.6  Usual Body Weight: 101 lb 4 oz (45.9 kg) (8/16/23)  % Weight Change (Calculated): -15.9  Weight Adjustment For: No Adjustment Detail Level: Detailed Depth Of Biopsy: dermis Was A Bandage Applied: Yes Size Of Lesion In Cm: 0.3 X Size Of Lesion In Cm: 0 Biopsy Type: H and E Biopsy Method: Personna blade Anesthesia Type: 1% lidocaine without epinephrine Anesthesia Volume In Cc (Will Not Render If 0): 1.5 Hemostasis: Aluminum Chloride Wound Care: Vaseline Dressing: Band-Aid Destruction After The Procedure: No Type Of Destruction Used: Curettage Curettage Text: The wound bed was treated with curettage after the biopsy was performed. Cryotherapy Text: The wound bed was treated with cryotherapy after the biopsy was performed. Electrodesiccation Text: The wound bed was treated with electrodesiccation after the biopsy was performed. Electrodesiccation And Curettage Text: The wound bed was treated with electrodesiccation and curettage after the biopsy was performed. Silver Nitrate Text: The wound bed was treated with silver nitrate after the biopsy was performed. Lab: [facility] Consent: Written consent was obtained and risks were reviewed including but not limited to scarring, infection, bleeding, scabbing, incomplete removal, nerve damage and allergy to anesthesia. Post-Care Instructions: I reviewed with the patient in detail post-care instructions. Patient is to keep the biopsy site dry overnight, and then apply bacitracin twice daily until healed. Patient may apply hydrogen peroxide soaks to remove any crusting. Notification Instructions: Patient will be notified of biopsy results. However, patient instructed to call the office if not contacted within 2 weeks. Billing Type: [employer] Information: Selecting Yes will display possible errors in your note based on the variables you have selected. This validation is only offered as a suggestion for you. PLEASE NOTE THAT THE VALIDATION TEXT WILL BE REMOVED WHEN YOU FINALIZE YOUR NOTE. IF YOU WANT TO FAX A PRELIMINARY NOTE YOU WILL NEED TO TOGGLE THIS TO 'NO' IF YOU DO NOT WANT IT IN YOUR FAXED NOTE.

## 2023-09-01 NOTE — ACP (ADVANCE CARE PLANNING)
Advance Care Planning     Advance Care Planning (ACP) Physician/NP/PA (Provider) Conversation      Date of ACP Conversation: 09/01/2023    Conversation Conducted with:   Patient with Decision Making [id number] Decision Maker:    Current Designated Health Care Decision Maker:    Primary Decision Maker: Chin Escalante/[facility] - [phone]    Care Preferences:      Ventilation:  No    Resuscitation:  No    Length of Voluntary ACP Conversation in minutes:  10  minute included w/ total consult time     Trygve Gosselin, PA-C

## 2023-09-01 NOTE — PROGRESS NOTES
Visit with the pt to explain Hospice services in the SNF. She states she does understand what Hospice is and knows she has cancer. She is open to receiving Hospice services in the SNF. She did ask about [facility]. Hospice will follow after dc and will provide services as applicable. Emotional and sc support provided.

## 2023-09-01 NOTE — CARE COORDINATION
Patient is current with [facility] for [address]. Will follow. Please advise when patient discharges. WILL NEED RESUMPTION OF CARE ORDERS TO RESUME HH SERVICES WHEN PATIENT DISCHARGES. Thank you. [id number] [facility] [phone]. [phone].     Juan Luis Osorio RN, Home Care Liaison  [phone] P  Electronically signed by Juan Luis Osorio on 9/1/2023 at 8:44 AM

## 2023-09-01 NOTE — CONSULTS
Palliative Care Consult Note    9/1/2023 6:38 AM  Subjective:  Admit Date: 8/31/2023  PCP: Clarke Diggs MD    Date of Service: 9/1/2023    Reason for Consultation:  Goals of Care, Code Status, Family Support     History Obtained From: EMR/Patient and their Family    History Of Present Illness: The patient is a 80 y.o. female with PMH suspected lung cancer w/ known numerous bilateral solid and groundglass nodules previously declining work up, AAA, CHF, COPD dependent on 2L NC O2, ITP, HTN who presented to [facility] ED on 8/31/2023 complaining of shortness of breath, decrease in appetite, fatigue, wheezing that has been progressive in nature since discharge from this facility on 8/17/2023. Chest x-ray reported 10 mm nodule in the left lower lobe as well as a small right pleural effusion with adjacent consolidation. D-dimer elevated to 1.22. CTA pulmonary protocol was unremarkable for pulmonary embolism but did again reveal concern for neoplasm. During a previous hospitalization she was found to have numerous bilateral solid and groundglass nodules that was suspected to be a metastatic process. At that time she declined further cancer work-up stating she would not be seeking treatment. She has presented again since her initial diagnosis of suspected cancer with a similar presentation of dyspnea fatigue and decreased appetite. At that time we had lengthy discussion regarding home hospice services and hospice services at a nursing facility. She declined those services and opted to discharge home with home health. She has been alert and oriented and is her own decision maker with support from her son as her healthcare surrogate. In ED EKG was concerning for atrial fibrillation with rapid ventricular response which was treated with diltiazem. She also received Lasix. She was admitted to hospitalist service for atrial fibrillation with rapid ventricular response. A cardiology consult is pending at this time. DNR  Cough/dyspnea-continue neb treatments, mucinex, Morphine prn air hunger  Malnutrition/decreased oral intake-add megace   Afib RVR-mgmt per Hospitalist/Cardiology   Abnormal lung CT w/ suspected metastatic process-seen by Oncology during previous hospitalizations and has declined further cancer work up and does not wish to pursue treatment      Thank you for consulting palliative care and allowing us to participate in the care of the patient.       CounselingTopics: Goals of care, Code Status, Disease process education, pt/family support                                     Total Time Spent with patient assessment, interview of independent historian/HCS, workup/treatment review, discussion with medical team, discussion w/ hospice team, review of current and home medications, review of care everywhere and placement of orders/preparation of this note: 83 minutes    Electronically signed by Sean Rai PA-C on 9/1/2023 at 6:38 AM    (Please note that portions of this note were completed with a voice recognition program.  Efforts were made to edit the dictations but occasionally words are mis-transcribed.)

## 2023-09-01 NOTE — CARE COORDINATION
Pt listed with:     [id number]   V   F    They are following.     Electronically signed by Nayla Cavazos MBA, BSW on 9/1/2023 at 1:18 PM

## 2023-09-01 NOTE — PLAN OF CARE
Problem: Nutrition Deficit:  Goal: Optimize nutritional status  Outcome: Progressing  Flowsheets (Taken 9/1/2023 1322)  Nutrient intake appropriate for improving, restoring, or maintaining nutritional needs:   Monitor oral intake, labs, and treatment plans   Recommend appropriate diets, oral nutritional supplements, and vitamin/mineral supplements

## 2023-09-02 LAB
ANION GAP SERPL CALCULATED.3IONS-SCNC: 11 MMOL/L (ref 7–19)
BUN SERPL-MCNC: 23 MG/DL (ref 8–23)
CALCIUM SERPL-MCNC: 9 MG/DL (ref 8.2–9.6)
CHLORIDE SERPL-SCNC: 94 MMOL/L (ref 98–111)
CO2 SERPL-SCNC: 37 MMOL/L (ref 22–29)
CREAT SERPL-MCNC: 1 MG/DL (ref 0.5–0.9)
ERYTHROCYTE [DISTWIDTH] IN BLOOD BY AUTOMATED COUNT: 14.7 % (ref 11.5–14.5)
GLUCOSE SERPL-MCNC: 115 MG/DL (ref 74–109)
HCT VFR BLD AUTO: 31.2 % (ref 37–47)
HGB BLD-MCNC: 8.8 G/DL (ref 12–16)
MCH RBC QN AUTO: 26.2 PG (ref 27–31)
MCHC RBC AUTO-ENTMCNC: 28.2 G/DL (ref 33–37)
MCV RBC AUTO: 92.9 FL (ref 81–99)
PLATELET # BLD AUTO: 263 K/UL (ref 130–400)
PMV BLD AUTO: 9.1 FL (ref 9.4–12.3)
POTASSIUM SERPL-SCNC: 4.2 MMOL/L (ref 3.5–5)
RBC # BLD AUTO: 3.36 M/UL (ref 4.2–5.4)
SODIUM SERPL-SCNC: 142 MMOL/L (ref 136–145)
WBC # BLD AUTO: 7.3 K/UL (ref 4.8–10.8)

## 2023-09-02 PROCEDURE — 2700000000 HC OXYGEN THERAPY PER DAY

## 2023-09-02 PROCEDURE — 6370000000 HC RX 637 (ALT 250 FOR IP): Performed by: PHYSICIAN ASSISTANT

## 2023-09-02 PROCEDURE — 6370000000 HC RX 637 (ALT 250 FOR IP): Performed by: NURSE PRACTITIONER

## 2023-09-02 PROCEDURE — 94760 N-INVAS EAR/PLS OXIMETRY 1: CPT

## 2023-09-02 PROCEDURE — 94640 AIRWAY INHALATION TREATMENT: CPT

## 2023-09-02 PROCEDURE — 36415 COLL VENOUS BLD VENIPUNCTURE: CPT

## 2023-09-02 PROCEDURE — 2140000000 HC CCU INTERMEDIATE R&B

## 2023-09-02 PROCEDURE — 2580000003 HC RX 258: Performed by: NURSE PRACTITIONER

## 2023-09-02 PROCEDURE — 80048 BASIC METABOLIC PNL TOTAL CA: CPT

## 2023-09-02 PROCEDURE — 6370000000 HC RX 637 (ALT 250 FOR IP): Performed by: INTERNAL MEDICINE

## 2023-09-02 PROCEDURE — 85027 COMPLETE CBC AUTOMATED: CPT

## 2023-09-02 RX ADMIN — IPRATROPIUM BROMIDE AND ALBUTEROL SULFATE 1 DOSE: 2.5; .5 SOLUTION RESPIRATORY (INHALATION) at 06:42

## 2023-09-02 RX ADMIN — FERROUS SULFATE TAB 325 MG (65 MG ELEMENTAL FE) 325 MG: 325 (65 FE) TAB at 09:03

## 2023-09-02 RX ADMIN — PANTOPRAZOLE SODIUM 40 MG: 40 TABLET, DELAYED RELEASE ORAL at 06:38

## 2023-09-02 RX ADMIN — SODIUM CHLORIDE, PRESERVATIVE FREE 10 ML: 5 INJECTION INTRAVENOUS at 22:11

## 2023-09-02 RX ADMIN — GUAIFENESIN 600 MG: 600 TABLET ORAL at 22:08

## 2023-09-02 RX ADMIN — MEGESTROL ACETATE 200 MG: 40 SUSPENSION ORAL at 09:03

## 2023-09-02 RX ADMIN — Medication 2 PUFF: at 22:25

## 2023-09-02 RX ADMIN — IPRATROPIUM BROMIDE AND ALBUTEROL SULFATE 1 DOSE: 2.5; .5 SOLUTION RESPIRATORY (INHALATION) at 15:03

## 2023-09-02 RX ADMIN — IPRATROPIUM BROMIDE AND ALBUTEROL SULFATE 1 DOSE: 2.5; .5 SOLUTION RESPIRATORY (INHALATION) at 19:08

## 2023-09-02 RX ADMIN — MEGESTROL ACETATE 200 MG: 40 SUSPENSION ORAL at 22:09

## 2023-09-02 RX ADMIN — IPRATROPIUM BROMIDE AND ALBUTEROL SULFATE 1 DOSE: 2.5; .5 SOLUTION RESPIRATORY (INHALATION) at 10:40

## 2023-09-02 RX ADMIN — DILTIAZEM HYDROCHLORIDE 120 MG: 120 CAPSULE, COATED, EXTENDED RELEASE ORAL at 09:02

## 2023-09-02 RX ADMIN — DOCUSATE SODIUM 100 MG: 100 CAPSULE, LIQUID FILLED ORAL at 22:08

## 2023-09-02 RX ADMIN — SODIUM CHLORIDE, PRESERVATIVE FREE 10 ML: 5 INJECTION INTRAVENOUS at 09:07

## 2023-09-02 RX ADMIN — ATORVASTATIN CALCIUM 10 MG: 10 TABLET, FILM COATED ORAL at 09:02

## 2023-09-02 RX ADMIN — CLONAZEPAM 0.5 MG: 0.5 TABLET ORAL at 22:08

## 2023-09-02 RX ADMIN — FERROUS SULFATE TAB 325 MG (65 MG ELEMENTAL FE) 325 MG: 325 (65 FE) TAB at 22:08

## 2023-09-02 ASSESSMENT — ENCOUNTER SYMPTOMS
ABDOMINAL DISTENTION: 0
ABDOMINAL PAIN: 0
NAUSEA: 0
VOMITING: 0
DIARRHEA: 0
SHORTNESS OF BREATH: 1
CONSTIPATION: 0
BACK PAIN: 1

## 2023-09-02 ASSESSMENT — PAIN SCALES - GENERAL: PAINLEVEL_OUTOF10: 0

## 2023-09-02 NOTE — PROGRESS NOTES
Pulses: Normal pulses. Heart sounds: Normal heart sounds. Pulmonary:      Effort: Pulmonary effort is normal.      Comments: Decreased breath sounds in the lower lobes  Abdominal:      General: Bowel sounds are normal. There is no distension. Palpations: Abdomen is soft. Tenderness: There is no abdominal tenderness. Musculoskeletal:      Cervical back: Normal range of motion. Right lower leg: No edema. Left lower leg: No edema. Skin:     General: Skin is warm and dry. Neurological:      Mental Status: She is alert and oriented to person, place, and time.    Psychiatric:         Mood and Affect: Mood normal.         Behavior: Behavior normal.           Medications:      dilTIAZem (CARDIZEM) 125 mg/125 mL infusion Stopped (09/01/23 1138)    sodium chloride        megestrol  200 mg Oral BID    dilTIAZem  120 mg Oral Daily    sodium chloride flush  5-40 mL IntraVENous 2 times per day    clonazePAM  0.5 mg Oral Nightly    docusate sodium  100 mg Oral BID    ferrous sulfate  325 mg Oral BID    guaiFENesin  600 mg Oral BID    ipratropium 0.5 mg-albuterol 2.5 mg  1 Dose Inhalation Q4H WA RT    pantoprazole  40 mg Oral QAM AC    atorvastatin  10 mg Oral Daily     morphine 20MG/ML, sodium chloride flush, sodium chloride, ondansetron **OR** ondansetron, polyethylene glycol, acetaminophen **OR** acetaminophen, albuterol sulfate HFA  ADULT ORAL NUTRITION SUPPLEMENT; Breakfast, Dinner; Standard High Calorie/High Protein Oral Supplement  ADULT DIET; Easy to Chew; Sherbet every lunch and dinner     Lab and other Data:     Recent Labs     08/31/23  1325 09/01/23  0143 09/02/23  0210   WBC 9.9 8.1 7.3   HGB 8.7* 9.5* 8.8*    275 263       Recent Labs     08/31/23  1325 09/01/23  0143 09/02/23  0210    139 142   K 4.9 4.6 4.2   CL 96* 93* 94*   CO2 36* 36* 37*   BUN 23 23 23   CREATININE 0.9 1.0* 1.0*   GLUCOSE 90 133* 115*       Recent Labs     08/31/23  1325   AST 16   ALT 8   BILITOT 0.4 ALKPHOS 65       Troponin T:   Recent Labs     08/31/23  1325   TROPONINI 0.01       Pro-BNP: No results for input(s): BNP in the last 72 hours. INR: No results for input(s): INR in the last 72 hours. UA:No results for input(s): NITRITE, COLORU, PHUR, LABCAST, WBCUA, RBCUA, MUCUS, TRICHOMONAS, YEAST, BACTERIA, CLARITYU, SPECGRAV, LEUKOCYTESUR, UROBILINOGEN, BILIRUBINUR, BLOODU, GLUCOSEU, AMORPHOUS in the last 72 hours. Invalid input(s): Jordana Cadet  A1C: No results for input(s): LABA1C in the last 72 hours. ABG:No results for input(s): PHART, HVE6HRK, PO2ART, JBW2GWV, BEART, HGBAE, T9TMTHEU, CARBOXHGBART in the last 72 hours. RAD:   XR CHEST PORTABLE    Result Date: 8/31/2023   10 mm nodule in the left lower lobe. This is better demonstrated on the CT chest examination from 08/10/2023. Please see that report for further details and additional findings including additional pulmonary nodules. Small right pleural effusion with adjacent consolidation. ______________________________________ Electronically signed by: Dom MALDONADO Date:     08/31/2023 Time:    13:48     CTA PULMONARY W CONTRAST    Result Date: 8/31/2023  1. No pulmonary arterial thromboembolism identified. 2.  Multiple complex findings as described including a small right pleural effusion with adjacent consolidation and multiple pulmonary nodules concerning for metastatic disease. Significant cardiomegaly and atherosclerotic disease also noted. Follow-up CT is recommended. - - - - -  All CT scans are performed using dose optimization techniques as appropriate to the performed exam and include at least one of the following: Automated exposure control, adjustment of the mA and/or kV according to size, and the use of iterative reconstruction technique. ______________________________________ Electronically signed by: Philip Weiner M.D.  Date:     08/31/2023 Time:    17:24       Assessment/Plan   Principal Problem:    Atrial

## 2023-09-02 NOTE — PLAN OF CARE
Problem: Discharge Planning  Goal: Discharge to home or other facility with appropriate resources  9/2/2023 0248 by Randle Lesches, RN  Outcome: Progressing  9/1/2023 1447 by Chelsie Lr RN  Outcome: Progressing  Flowsheets (Taken 9/1/2023 0800)  Discharge to home or other facility with appropriate resources:   Identify barriers to discharge with patient and caregiver   Arrange for needed discharge resources and transportation as appropriate   Identify discharge learning needs (meds, wound care, etc)   Arrange for interpreters to assist at discharge as needed   Refer to discharge planning if patient needs post-hospital services based on physician order or complex needs related to functional status, cognitive ability or social support system     Problem: Skin/Tissue Integrity  Goal: Absence of new skin breakdown  Description: 1. Monitor for areas of redness and/or skin breakdown  2. Assess vascular access sites hourly  3. Every 4-6 hours minimum:  Change oxygen saturation probe site  4. Every 4-6 hours:  If on nasal continuous positive airway pressure, respiratory therapy assess nares and determine need for appliance change or resting period.   9/2/2023 0248 by Randle Lesches, RN  Outcome: Progressing  9/1/2023 1447 by Chelsie Lr RN  Outcome: Progressing     Problem: Safety - Adult  Goal: Free from fall injury  9/2/2023 0248 by Randle Lesches, RN  Outcome: Progressing  9/1/2023 1447 by Chelsie Lr RN  Outcome: Progressing  Flowsheets (Taken 9/1/2023 0800)  Free From Fall Injury:   Instruct family/caregiver on patient safety   Based on caregiver fall risk screen, instruct family/caregiver to ask for assistance with transferring infant if caregiver noted to have fall risk factors     Problem: ABCDS Injury Assessment  Goal: Absence of physical injury  9/2/2023 0248 by Randle Lesches, RN  Outcome: Progressing  9/1/2023 1447 by Chelsie Lr RN  Outcome: Progressing  Flowsheets (Taken 9/1/2023 0800)  Absence

## 2023-09-02 NOTE — CONSULTS
[facility]  Cardiology Consult      Requesting MD:  Jaskaran Vanessa MD   Admit Status:         History obtained from:   [] Patient  [] Other (specify):     Patient:  Sampson Onofre  [id number]     Chief Complaint:   Chief Complaint   Patient presents with    Shortness of Breath     Pt arrives via EMS from home. Reports SOB onset 18th, was admitted twice for symptoms. Reports not getting better, now she has decrease in appetite. Pt states increased fatigue. EMS reports pt on 2L/min O2, 85% at home w/double extended, if not longer oxygen tubing. They gave duoneb and sat came up to 91%         HPI: Ms. Jose Carlos Sandoval is a 80 y.o. female with a history of COPD on home oxygen, hypertension, atrial fibrillation, several pulmonary nodules suspected malignancy, who presents with worsening shortness of breath. She had a recent hospitalization for COPD exacerbation and reports she has been having worsening shortness of breath since discharge. She has been increasing her use of her albuterol inhaler with persistent symptoms. Since readmission she reports her breathing has started to get better. Denies any chest pain palpitations. She was found to have atrial fibrillation with rapid ventricular rate and cardiology consulted to further evaluate. She has a history of atrial fibrillation and has been rate controlled in the past with metoprolol  Review of Systems:  Review of Systems   Constitutional:  Positive for activity change, appetite change and fatigue. Respiratory:  Positive for shortness of breath and wheezing. All other systems reviewed and are negative.     Cardiac Specific Data:  Specialty Problems          Cardiology Problems    * (Principal) Atrial fibrillation with RVR [facility])           Past Medical History:  Past Medical History:   Diagnosis Date    Abdominal aortic aneurysm (AAA) (HCC)     Anemia     iron deficiecy    Anxiety     Arthritis     CHF (congestive heart failure) (HCC)     COPD (chronic Devices:  None  Lungs and Pleura:  Small right pleural effusion with adjacent consolidation. Right-sided volume loss. Coarse, prominent interstitial markings in both lungs. Cardiac silhouette: Stable. Bones: No acute abnormality. 10 mm nodule in the left lower lobe. This is better demonstrated on the CT chest examination from 08/10/2023. Please see that report for further details and additional findings including additional pulmonary nodules. Small right pleural effusion with adjacent consolidation. ______________________________________ Electronically signed by: Daksha MALDONADO Date:     08/31/2023 Time:    13:48     XR CHEST PORTABLE    Result Date: 8/15/2023  EXAM:  CHEST FRONTAL VIEW  HISTORY:  Shortness of breath, cough. COMPARISON:  08/10/2023      FINDINGS/IMPRESSION:  Cardiomegaly and atherosclerotic disease. Hyperinflation. There is diffuse, chronic appearing interstitial accentuation. No acute infiltrates or consolidated pneumonia. No pleural fluid or pneumothorax.    ______________________________________ Electronically signed by: Sindhu Cardona M.D. Date:     08/15/2023 Time:    17:50     XR CHEST PORTABLE    Result Date: 8/10/2023  EXAM: CHEST RADIOGRAPH (1 VIEW)  TECHNIQUE: Frontal Chest Radiograph. HISTORY: Shortness of breath  COMPARISON: 02/22/2018. FINDINGS:  Lines, Tubes, Devices:  None  Lungs and Pleura:  No focal consolidation. No pleural effusion. No pneumothorax. Right lung volume loss, stable. Emphysema. Cardiac silhouette: Enlarged. Bones: No acute abnormality. No acute radiographic abnormality. Enlarged cardiac silhouette. Right lung volume loss, stable.    ______________________________________ Electronically signed by: Yodit MALDONADO  Date:     08/10/2023 Time:    15:01     CTA PULMONARY W CONTRAST    Result Date: 8/31/2023  EXAM:  CT ANGIOGRAPHY CHEST (PE PROTOCOL)  HISTORY:  Chest pain, shortness of breath  TECHNIQUE:  CTA chest with

## 2023-09-02 NOTE — PLAN OF CARE
Problem: Discharge Planning  Goal: Discharge to home or other facility with appropriate resources  9/2/2023 1607 by Nohemi Durant RN  Outcome: Progressing  9/2/2023 0248 by Kervin Cates RN  Outcome: Progressing     Problem: Skin/Tissue Integrity  Goal: Absence of new skin breakdown  Description: 1. Monitor for areas of redness and/or skin breakdown  2. Assess vascular access sites hourly  3. Every 4-6 hours minimum:  Change oxygen saturation probe site  4. Every 4-6 hours:  If on nasal continuous positive airway pressure, respiratory therapy assess nares and determine need for appliance change or resting period.   9/2/2023 1607 by Nohemi Durant RN  Outcome: Progressing  9/2/2023 0248 by Kervin Cates RN  Outcome: Progressing     Problem: Safety - Adult  Goal: Free from fall injury  9/2/2023 1607 by Nohemi Durant RN  Outcome: Progressing  9/2/2023 0248 by Kervin Cates RN  Outcome: Progressing     Problem: ABCDS Injury Assessment  Goal: Absence of physical injury  9/2/2023 1607 by Nohemi Durant RN  Outcome: Progressing  9/2/2023 0248 by Kervin Cates RN  Outcome: Progressing     Problem: Nutrition Deficit:  Goal: Optimize nutritional status  9/2/2023 0248 by Kervin Cates RN  Outcome: Progressing     Problem: Chronic Conditions and Co-morbidities  Goal: Patient's chronic conditions and co-morbidity symptoms are monitored and maintained or improved  9/2/2023 1607 by Nohemi Durant RN  Outcome: Progressing  9/2/2023 0248 by Kervin Cates RN  Outcome: Progressing

## 2023-09-03 LAB
ANION GAP SERPL CALCULATED.3IONS-SCNC: 7 MMOL/L (ref 7–19)
BNP BLD-MCNC: 3036 PG/ML (ref 0–449)
BUN SERPL-MCNC: 26 MG/DL (ref 8–23)
CALCIUM SERPL-MCNC: 9 MG/DL (ref 8.2–9.6)
CHLORIDE SERPL-SCNC: 93 MMOL/L (ref 98–111)
CO2 SERPL-SCNC: 39 MMOL/L (ref 22–29)
CREAT SERPL-MCNC: 1.2 MG/DL (ref 0.5–0.9)
ERYTHROCYTE [DISTWIDTH] IN BLOOD BY AUTOMATED COUNT: 14.7 % (ref 11.5–14.5)
GLUCOSE SERPL-MCNC: 103 MG/DL (ref 74–109)
HCT VFR BLD AUTO: 31.4 % (ref 37–47)
HGB BLD-MCNC: 8.9 G/DL (ref 12–16)
MCH RBC QN AUTO: 26.3 PG (ref 27–31)
MCHC RBC AUTO-ENTMCNC: 28.3 G/DL (ref 33–37)
MCV RBC AUTO: 92.9 FL (ref 81–99)
PLATELET # BLD AUTO: 274 K/UL (ref 130–400)
PMV BLD AUTO: 9.3 FL (ref 9.4–12.3)
POTASSIUM SERPL-SCNC: 4.3 MMOL/L (ref 3.5–5)
RBC # BLD AUTO: 3.38 M/UL (ref 4.2–5.4)
SODIUM SERPL-SCNC: 139 MMOL/L (ref 136–145)
WBC # BLD AUTO: 7.5 K/UL (ref 4.8–10.8)

## 2023-09-03 PROCEDURE — 6370000000 HC RX 637 (ALT 250 FOR IP): Performed by: NURSE PRACTITIONER

## 2023-09-03 PROCEDURE — 94640 AIRWAY INHALATION TREATMENT: CPT

## 2023-09-03 PROCEDURE — 2580000003 HC RX 258: Performed by: NURSE PRACTITIONER

## 2023-09-03 PROCEDURE — 2700000000 HC OXYGEN THERAPY PER DAY

## 2023-09-03 PROCEDURE — 94760 N-INVAS EAR/PLS OXIMETRY 1: CPT

## 2023-09-03 PROCEDURE — 6370000000 HC RX 637 (ALT 250 FOR IP): Performed by: PHYSICIAN ASSISTANT

## 2023-09-03 PROCEDURE — 2140000000 HC CCU INTERMEDIATE R&B

## 2023-09-03 PROCEDURE — 6370000000 HC RX 637 (ALT 250 FOR IP): Performed by: INTERNAL MEDICINE

## 2023-09-03 PROCEDURE — 6370000000 HC RX 637 (ALT 250 FOR IP)

## 2023-09-03 PROCEDURE — 85027 COMPLETE CBC AUTOMATED: CPT

## 2023-09-03 PROCEDURE — 36415 COLL VENOUS BLD VENIPUNCTURE: CPT

## 2023-09-03 PROCEDURE — 80048 BASIC METABOLIC PNL TOTAL CA: CPT

## 2023-09-03 PROCEDURE — 83880 ASSAY OF NATRIURETIC PEPTIDE: CPT

## 2023-09-03 RX ADMIN — IPRATROPIUM BROMIDE AND ALBUTEROL SULFATE 1 DOSE: 2.5; .5 SOLUTION RESPIRATORY (INHALATION) at 07:07

## 2023-09-03 RX ADMIN — IPRATROPIUM BROMIDE AND ALBUTEROL SULFATE 1 DOSE: 2.5; .5 SOLUTION RESPIRATORY (INHALATION) at 14:36

## 2023-09-03 RX ADMIN — DILTIAZEM HYDROCHLORIDE 120 MG: 120 CAPSULE, COATED, EXTENDED RELEASE ORAL at 08:57

## 2023-09-03 RX ADMIN — IPRATROPIUM BROMIDE AND ALBUTEROL SULFATE 1 DOSE: 2.5; .5 SOLUTION RESPIRATORY (INHALATION) at 18:39

## 2023-09-03 RX ADMIN — IPRATROPIUM BROMIDE AND ALBUTEROL SULFATE 1 DOSE: 2.5; .5 SOLUTION RESPIRATORY (INHALATION) at 10:47

## 2023-09-03 RX ADMIN — MEGESTROL ACETATE 200 MG: 40 SUSPENSION ORAL at 20:42

## 2023-09-03 RX ADMIN — FERROUS SULFATE TAB 325 MG (65 MG ELEMENTAL FE) 325 MG: 325 (65 FE) TAB at 08:57

## 2023-09-03 RX ADMIN — SODIUM CHLORIDE, PRESERVATIVE FREE 10 ML: 5 INJECTION INTRAVENOUS at 20:42

## 2023-09-03 RX ADMIN — PANTOPRAZOLE SODIUM 40 MG: 40 TABLET, DELAYED RELEASE ORAL at 06:34

## 2023-09-03 RX ADMIN — GUAIFENESIN 600 MG: 600 TABLET ORAL at 20:42

## 2023-09-03 RX ADMIN — CLONAZEPAM 0.5 MG: 0.5 TABLET ORAL at 20:42

## 2023-09-03 RX ADMIN — GUAIFENESIN 600 MG: 600 TABLET ORAL at 08:57

## 2023-09-03 RX ADMIN — MEGESTROL ACETATE 200 MG: 40 SUSPENSION ORAL at 08:57

## 2023-09-03 RX ADMIN — APIXABAN 2.5 MG: 2.5 TABLET, FILM COATED ORAL at 20:42

## 2023-09-03 RX ADMIN — SODIUM CHLORIDE, PRESERVATIVE FREE 10 ML: 5 INJECTION INTRAVENOUS at 09:09

## 2023-09-03 RX ADMIN — DOCUSATE SODIUM 100 MG: 100 CAPSULE, LIQUID FILLED ORAL at 08:57

## 2023-09-03 RX ADMIN — DOCUSATE SODIUM 100 MG: 100 CAPSULE, LIQUID FILLED ORAL at 20:42

## 2023-09-03 RX ADMIN — FERROUS SULFATE TAB 325 MG (65 MG ELEMENTAL FE) 325 MG: 325 (65 FE) TAB at 20:42

## 2023-09-03 RX ADMIN — ATORVASTATIN CALCIUM 10 MG: 10 TABLET, FILM COATED ORAL at 08:57

## 2023-09-03 ASSESSMENT — ENCOUNTER SYMPTOMS
ABDOMINAL PAIN: 0
VOMITING: 0
SHORTNESS OF BREATH: 1
BACK PAIN: 1
ABDOMINAL DISTENTION: 0
DIARRHEA: 0
NAUSEA: 0
CONSTIPATION: 0

## 2023-09-03 NOTE — PROGRESS NOTES
iterative reconstruction technique. ______________________________________ Electronically signed by: Stacia Carlos M.D. Date:     08/31/2023 Time:    17:24       Assessment/Plan   Principal Problem:    Atrial fibrillation with RVR (HCC)  Active Problems:    COPD (chronic obstructive pulmonary disease) (HCC)    History of anemia    Severe malnutrition (HCC)    Failure to thrive in adult  Resolved Problems:    * No resolved hospital problems. *    Principal Problem:    Atrial fibrillation with RVR  -diltiazem oral   -telemetry  -trend troponin  -consult cards   -hold norvasc    Elevated ddimer  -CTA pulmonary- No pulmonary arterial thromboembolism identified.  a small right pleural effusion with adjacent consolidation and multiple pulmonary nodules concerning for metastatic disease.     -Eliquis 2.5mg per Cardiology initiated per Cardiology recommendation      Elevated bnp/History of chf  -I's and O's  -daily weight  -BNP improved at 3,036    Active Problems:    COPD (chronic obstructive pulmonary disease) (HCC)  -spot check spo2 prn  -mucinex 600mg po bid  -monitor for increasing supplemental    oxygen requirements              -pt currently on home o2 at 2L per nc      History of anemia              -monitor hgb/hct  8.8/31.2              -transfuse hgb less than 7      Failure to thrive  -case management consult   -Encouraged oral intake      Multiple pulmonary nodules concerning for metastatic process              -consult palliative care-goal of care   -Hospice consulted, patient plan to discharge to [facility] located in [facility] [address] [facility], APRN - CNP, 9/3/2023 9:11 AM

## 2023-09-04 PROCEDURE — 94640 AIRWAY INHALATION TREATMENT: CPT

## 2023-09-04 PROCEDURE — 2140000000 HC CCU INTERMEDIATE R&B

## 2023-09-04 PROCEDURE — 94760 N-INVAS EAR/PLS OXIMETRY 1: CPT

## 2023-09-04 PROCEDURE — 99232 SBSQ HOSP IP/OBS MODERATE 35: CPT | Performed by: INTERNAL MEDICINE

## 2023-09-04 PROCEDURE — 6370000000 HC RX 637 (ALT 250 FOR IP)

## 2023-09-04 PROCEDURE — 6370000000 HC RX 637 (ALT 250 FOR IP): Performed by: INTERNAL MEDICINE

## 2023-09-04 PROCEDURE — 6370000000 HC RX 637 (ALT 250 FOR IP): Performed by: NURSE PRACTITIONER

## 2023-09-04 PROCEDURE — 2700000000 HC OXYGEN THERAPY PER DAY

## 2023-09-04 PROCEDURE — 2580000003 HC RX 258: Performed by: NURSE PRACTITIONER

## 2023-09-04 PROCEDURE — 6370000000 HC RX 637 (ALT 250 FOR IP): Performed by: PHYSICIAN ASSISTANT

## 2023-09-04 RX ORDER — DILTIAZEM HYDROCHLORIDE 120 MG/1
120 CAPSULE, COATED, EXTENDED RELEASE ORAL ONCE
Status: COMPLETED | OUTPATIENT
Start: 2023-09-04 | End: 2023-09-04

## 2023-09-04 RX ORDER — DILTIAZEM HYDROCHLORIDE 120 MG/1
240 CAPSULE, COATED, EXTENDED RELEASE ORAL DAILY
Status: DISCONTINUED | OUTPATIENT
Start: 2023-09-05 | End: 2023-09-08 | Stop reason: HOSPADM

## 2023-09-04 RX ADMIN — PANTOPRAZOLE SODIUM 40 MG: 40 TABLET, DELAYED RELEASE ORAL at 08:18

## 2023-09-04 RX ADMIN — MEGESTROL ACETATE 200 MG: 40 SUSPENSION ORAL at 21:05

## 2023-09-04 RX ADMIN — SODIUM CHLORIDE, PRESERVATIVE FREE 10 ML: 5 INJECTION INTRAVENOUS at 21:06

## 2023-09-04 RX ADMIN — MEGESTROL ACETATE 200 MG: 40 SUSPENSION ORAL at 08:18

## 2023-09-04 RX ADMIN — FERROUS SULFATE TAB 325 MG (65 MG ELEMENTAL FE) 325 MG: 325 (65 FE) TAB at 08:18

## 2023-09-04 RX ADMIN — DILTIAZEM HYDROCHLORIDE 120 MG: 120 CAPSULE, COATED, EXTENDED RELEASE ORAL at 08:18

## 2023-09-04 RX ADMIN — SODIUM CHLORIDE, PRESERVATIVE FREE 10 ML: 5 INJECTION INTRAVENOUS at 08:18

## 2023-09-04 RX ADMIN — APIXABAN 2.5 MG: 2.5 TABLET, FILM COATED ORAL at 21:05

## 2023-09-04 RX ADMIN — GUAIFENESIN 600 MG: 600 TABLET ORAL at 08:18

## 2023-09-04 RX ADMIN — FERROUS SULFATE TAB 325 MG (65 MG ELEMENTAL FE) 325 MG: 325 (65 FE) TAB at 21:05

## 2023-09-04 RX ADMIN — IPRATROPIUM BROMIDE AND ALBUTEROL SULFATE 1 DOSE: 2.5; .5 SOLUTION RESPIRATORY (INHALATION) at 19:51

## 2023-09-04 RX ADMIN — APIXABAN 2.5 MG: 2.5 TABLET, FILM COATED ORAL at 08:18

## 2023-09-04 RX ADMIN — DILTIAZEM HYDROCHLORIDE 120 MG: 120 CAPSULE, COATED, EXTENDED RELEASE ORAL at 11:47

## 2023-09-04 RX ADMIN — GUAIFENESIN 600 MG: 600 TABLET ORAL at 21:05

## 2023-09-04 RX ADMIN — IPRATROPIUM BROMIDE AND ALBUTEROL SULFATE 1 DOSE: 2.5; .5 SOLUTION RESPIRATORY (INHALATION) at 10:38

## 2023-09-04 RX ADMIN — DOCUSATE SODIUM 100 MG: 100 CAPSULE, LIQUID FILLED ORAL at 21:05

## 2023-09-04 RX ADMIN — IPRATROPIUM BROMIDE AND ALBUTEROL SULFATE 1 DOSE: 2.5; .5 SOLUTION RESPIRATORY (INHALATION) at 14:10

## 2023-09-04 RX ADMIN — IPRATROPIUM BROMIDE AND ALBUTEROL SULFATE 1 DOSE: 2.5; .5 SOLUTION RESPIRATORY (INHALATION) at 06:47

## 2023-09-04 RX ADMIN — ATORVASTATIN CALCIUM 10 MG: 10 TABLET, FILM COATED ORAL at 08:23

## 2023-09-04 RX ADMIN — DOCUSATE SODIUM 100 MG: 100 CAPSULE, LIQUID FILLED ORAL at 08:18

## 2023-09-04 RX ADMIN — CLONAZEPAM 0.5 MG: 0.5 TABLET ORAL at 21:05

## 2023-09-04 ASSESSMENT — ENCOUNTER SYMPTOMS
WHEEZING: 0
VOMITING: 0
TROUBLE SWALLOWING: 0
BLOOD IN STOOL: 0
SHORTNESS OF BREATH: 1
NAUSEA: 0
ABDOMINAL DISTENTION: 0
ABDOMINAL PAIN: 0
SINUS PAIN: 0
SORE THROAT: 0
COUGH: 0
CONSTIPATION: 0
DIARRHEA: 0

## 2023-09-04 ASSESSMENT — PAIN SCALES - GENERAL
PAINLEVEL_OUTOF10: 0
PAINLEVEL_OUTOF10: 0

## 2023-09-04 NOTE — CARE COORDINATION
[facility] reports they can take the Pt tomorrow. Ana at [employer] she has to get the paperwork done tomorrow.  Ashley  Ph: [phone]    Fa: [phone]  Sw has notified Pt nurse Sacha Larson RN  Electronically signed by CAMERON Villalba on 9/4/2023 at 3:33 PM

## 2023-09-04 NOTE — PROGRESS NOTES
EKGs  - Monitor on telemetry         Active Problems:    COPD (chronic obstructive pulmonary disease) ([address])-               - continue Bronchodilators               - Supplemental oxygen as needed, currently on 2L O2 per NC                 - Incentive spirometry               - Encourage deep breathing and cough         History of anemia-    - Monitor labs closely       Severe malnutrition (HCC)/  Failure to thrive in adult-   - noted     - Encourage oral intake     Multiple pulmonary nodules concerning for metastatic disease-    - Hospice consulted   - Hospice to follow outpatient   - Case management assisting with DC planning to SNF with hospice care           DVT Prophylaxis: Eliquis      GI prophylaxis:  Protonix     Discharge planning: Case management assisting with DC planning to SNF with hospice care        Further Orders per Clinical course/attending. Electronically signed by ROSA ELENA Leonard CNP on 9/4/2023 at 11:36 AM       EMR Dragon/Transcription disclaimer:   Much of this encounter note is an electronic transcription/translation of spoken language to printed text.  The electronic translation of spoken language may permit erroneous, or at times, nonsensical words or phrases to be inadvertently transcribed; although attempts have made to review the note for such errors, some may still exist.

## 2023-09-05 PROCEDURE — 94640 AIRWAY INHALATION TREATMENT: CPT

## 2023-09-05 PROCEDURE — 6370000000 HC RX 637 (ALT 250 FOR IP): Performed by: NURSE PRACTITIONER

## 2023-09-05 PROCEDURE — 92610 EVALUATE SWALLOWING FUNCTION: CPT

## 2023-09-05 PROCEDURE — 97110 THERAPEUTIC EXERCISES: CPT

## 2023-09-05 PROCEDURE — 6370000000 HC RX 637 (ALT 250 FOR IP): Performed by: INTERNAL MEDICINE

## 2023-09-05 PROCEDURE — 2700000000 HC OXYGEN THERAPY PER DAY

## 2023-09-05 PROCEDURE — 2140000000 HC CCU INTERMEDIATE R&B

## 2023-09-05 PROCEDURE — 2580000003 HC RX 258: Performed by: NURSE PRACTITIONER

## 2023-09-05 PROCEDURE — 97162 PT EVAL MOD COMPLEX 30 MIN: CPT

## 2023-09-05 PROCEDURE — 6370000000 HC RX 637 (ALT 250 FOR IP)

## 2023-09-05 PROCEDURE — 99232 SBSQ HOSP IP/OBS MODERATE 35: CPT | Performed by: PHYSICIAN ASSISTANT

## 2023-09-05 PROCEDURE — 97165 OT EVAL LOW COMPLEX 30 MIN: CPT

## 2023-09-05 PROCEDURE — 94760 N-INVAS EAR/PLS OXIMETRY 1: CPT

## 2023-09-05 PROCEDURE — 6370000000 HC RX 637 (ALT 250 FOR IP): Performed by: PHYSICIAN ASSISTANT

## 2023-09-05 PROCEDURE — 97530 THERAPEUTIC ACTIVITIES: CPT

## 2023-09-05 RX ORDER — CALCIUM CARBONATE 500 MG/1
500 TABLET, CHEWABLE ORAL 3 TIMES DAILY PRN
Status: DISCONTINUED | OUTPATIENT
Start: 2023-09-05 | End: 2023-09-08 | Stop reason: HOSPADM

## 2023-09-05 RX ORDER — SIMETHICONE 80 MG
80 TABLET,CHEWABLE ORAL EVERY 6 HOURS PRN
Status: DISCONTINUED | OUTPATIENT
Start: 2023-09-05 | End: 2023-09-08 | Stop reason: HOSPADM

## 2023-09-05 RX ADMIN — APIXABAN 2.5 MG: 2.5 TABLET, FILM COATED ORAL at 19:43

## 2023-09-05 RX ADMIN — MEGESTROL ACETATE 200 MG: 40 SUSPENSION ORAL at 08:04

## 2023-09-05 RX ADMIN — SODIUM CHLORIDE, PRESERVATIVE FREE 10 ML: 5 INJECTION INTRAVENOUS at 08:04

## 2023-09-05 RX ADMIN — CLONAZEPAM 0.5 MG: 0.5 TABLET ORAL at 19:43

## 2023-09-05 RX ADMIN — IPRATROPIUM BROMIDE AND ALBUTEROL SULFATE 1 DOSE: 2.5; .5 SOLUTION RESPIRATORY (INHALATION) at 10:59

## 2023-09-05 RX ADMIN — SODIUM CHLORIDE, PRESERVATIVE FREE 10 ML: 5 INJECTION INTRAVENOUS at 19:35

## 2023-09-05 RX ADMIN — APIXABAN 2.5 MG: 2.5 TABLET, FILM COATED ORAL at 08:04

## 2023-09-05 RX ADMIN — DOCUSATE SODIUM 100 MG: 100 CAPSULE, LIQUID FILLED ORAL at 08:04

## 2023-09-05 RX ADMIN — FERROUS SULFATE TAB 325 MG (65 MG ELEMENTAL FE) 325 MG: 325 (65 FE) TAB at 08:04

## 2023-09-05 RX ADMIN — GUAIFENESIN 600 MG: 600 TABLET ORAL at 19:43

## 2023-09-05 RX ADMIN — GUAIFENESIN 600 MG: 600 TABLET ORAL at 08:04

## 2023-09-05 RX ADMIN — IPRATROPIUM BROMIDE AND ALBUTEROL SULFATE 1 DOSE: 2.5; .5 SOLUTION RESPIRATORY (INHALATION) at 07:27

## 2023-09-05 RX ADMIN — IPRATROPIUM BROMIDE AND ALBUTEROL SULFATE 1 DOSE: 2.5; .5 SOLUTION RESPIRATORY (INHALATION) at 18:45

## 2023-09-05 RX ADMIN — IPRATROPIUM BROMIDE AND ALBUTEROL SULFATE 1 DOSE: 2.5; .5 SOLUTION RESPIRATORY (INHALATION) at 15:33

## 2023-09-05 RX ADMIN — PANTOPRAZOLE SODIUM 40 MG: 40 TABLET, DELAYED RELEASE ORAL at 06:01

## 2023-09-05 RX ADMIN — ATORVASTATIN CALCIUM 10 MG: 10 TABLET, FILM COATED ORAL at 08:04

## 2023-09-05 RX ADMIN — FERROUS SULFATE TAB 325 MG (65 MG ELEMENTAL FE) 325 MG: 325 (65 FE) TAB at 19:43

## 2023-09-05 RX ADMIN — DILTIAZEM HYDROCHLORIDE 240 MG: 120 CAPSULE, COATED, EXTENDED RELEASE ORAL at 08:04

## 2023-09-05 RX ADMIN — DOCUSATE SODIUM 100 MG: 100 CAPSULE, LIQUID FILLED ORAL at 19:43

## 2023-09-05 RX ADMIN — MEGESTROL ACETATE 200 MG: 40 SUSPENSION ORAL at 19:43

## 2023-09-05 ASSESSMENT — ENCOUNTER SYMPTOMS
CONSTIPATION: 0
NAUSEA: 0
ABDOMINAL PAIN: 0
SORE THROAT: 0
BLOOD IN STOOL: 0
SINUS PAIN: 0
DIARRHEA: 0
ABDOMINAL DISTENTION: 0
TROUBLE SWALLOWING: 0
SHORTNESS OF BREATH: 1
VOMITING: 0
WHEEZING: 0
COUGH: 0

## 2023-09-05 NOTE — PROGRESS NOTES
continue Bronchodilators               - Supplemental oxygen as needed, currently on 2L O2 per NC                 - Incentive spirometry               - Encourage deep breathing and cough         History of anemia-    - Monitor labs closely       Severe malnutrition (HCC)/  Failure to thrive in adult-   - noted     - Encourage oral intake     Multiple pulmonary nodules concerning for metastatic disease-    - Hospice to follow outpatient   - Case management assisting with DC planning to SNF           DVT Prophylaxis: Eliquis      GI prophylaxis:  Protonix     Discharge planning: Case management assisting with DC planning to SNF     Further Orders per Clinical course/attending. Electronically signed by ROSA ELENA Mcdermott CNP on 9/5/2023 at 3:36 PM       EMR Dragon/Transcription disclaimer:   Much of this encounter note is an electronic transcription/translation of spoken language to printed text.  The electronic translation of spoken language may permit erroneous, or at times, nonsensical words or phrases to be inadvertently transcribed; although attempts have made to review the note for such errors, some may still exist.

## 2023-09-05 NOTE — PROGRESS NOTES
Physician Progress Note      Judy SHEIKH #:                  [id number]  :                       1932  ADMIT DATE:       2023 12:19 PM  DISCH DATE:  RESPONDING  PROVIDER #:        Valentino Tucker MD          QUERY TEXT:    Patient admitted with sob and COPD, noted to have paroxysmal afib and is   maintained on Eliquis medication specified). If possible, please document in   progress notes and discharge summary if you are evaluating and/or treating any   of the following: The medical record reflects the following:  Risk Factors: Afib, COPD, failure to thrive  Clinical Indicators: Age, female, Afib,  Treatment: Eliquis 2.5 mg b.id. Thank you  Lonodn Colorado RN, BSN, [facility]  [phone]  Options provided:  -- Secondary hypercoagulable state in a patient with atrial fibrillation  -- Other - I will add my own diagnosis  -- Disagree - Not applicable / Not valid  -- Disagree - Clinically unable to determine / Unknown  -- Refer to Clinical Documentation Reviewer    PROVIDER RESPONSE TEXT:    This patient has secondary hypercoagulable state in a patient with atrial   fibrillation.     Query created by: London Colorado on 2023 7:29 AM      Electronically signed by:  Valentino Tucker MD 2023 9:05 AM

## 2023-09-05 NOTE — CARE COORDINATION
Pt will not be able to admit to [facility] as has no payor source and son states that he can't afford to pay self pay. Son has been instructed to call and get pt's Medicaid changed to a different plan that will pay room and board for SNF.  Electronically signed by Britni Rainey RN on 9/5/2023 at 11:32 AM

## 2023-09-05 NOTE — PROGRESS NOTES
Occupational Therapy  Facility/Department: [facility] PROGRESSIVE CARE  Occupational Therapy Initial Assessment    Name: Trey Wasserman  : 1932  MRN: [id number]  Date of Service: 2023    Discharge Recommendations:             Patient Diagnosis(es): The primary encounter diagnosis was Atrial fibrillation with RVR ([address]). Diagnoses of Chronic obstructive pulmonary disease, unspecified COPD type ([address]) and Chest pain, unspecified type were also pertinent to this visit. Past Medical History:  has a past medical history of Abdominal aortic aneurysm (AAA) ([address]), Anemia, Anxiety, Arthritis, CHF (congestive heart failure) ([address]), COPD (chronic obstructive pulmonary disease) ([address]), Depression, H/O idiopathic thrombocytopenic purpura, Hypertension, Migraines, and Palliative care patient. Past Surgical History:  has a past surgical history that includes Total abdominal hysterectomy w/ bilateral salpingoophorectomy; bone marrow biopsy; and Colonoscopy (2013). Treatment Diagnosis: A-fib, COPD, SOB      Assessment   Performance deficits / Impairments: Decreased functional mobility ; Decreased endurance;Decreased strength;Decreased ADL status; Decreased balance  Assessment: Will progress as tolerated  Treatment Diagnosis: A-fib, COPD, SOB  Prognosis: Good  Decision Making: Low Complexity  REQUIRES OT FOLLOW-UP: Yes  Activity Tolerance  Activity Tolerance: Patient Tolerated treatment well        Plan   Occupational Therapy Plan  Times Per Week: 3-5x/week  Times Per Day: Once a day     Restrictions  Restrictions/Precautions  Restrictions/Precautions: Fall Risk    Subjective   General  Chart Reviewed: Yes  Patient assessed for rehabilitation services?: Yes  Family / Caregiver Present: No  Diagnosis: A-fib, COPD, SOB  General Comment  Comments: Pt. had no trouble breathing today.   Currently on 3 liters O2  none reported  Social/Functional History  Social/Functional History  Lives With: Alone  Type of Home: [facility]

## 2023-09-05 NOTE — CARE COORDINATION
Bobby started for [place].   Awaiting insurance approval/denial.  Andrew Conv  Ph: [phone]    Fa: [phone]  Electronically signed by Jarrett Abebe RN on 9/5/2023 at 3:36 PM

## 2023-09-05 NOTE — PROGRESS NOTES
Palliative Care Progress Note  9/5/2023 9:49 AM    Patient:  Daniel Ferraro  YOB: 1932  Primary Care Physician: Aisha Zapata MD  Advance Directive: DNR  Admit Date: 8/31/2023       Hospital Day: 5  Portions of this note have been copied forward, however, changed to reflect the most current clinical status of this patient. CHIEF COMPLAINT/REASON FOR CONSULTATION Goals of care, family support, Code status, symptom management     SUBJECTIVE:  Ms. Korin Castillo complains of heart burn. She describes a \"burning sensation\" that she has associated with gas and need to use the bathroom. She otherwise denies pain. Dyspnea has improved    HPI:  The patient is a 80 y.o. female with PMH suspected lung cancer w/ known numerous bilateral solid and groundglass nodules previously declining work up, AAA, CHF, COPD dependent on 2L NC O2, ITP, HTN who presented to [facility] ED on 8/31/2023 complaining of shortness of breath, decrease in appetite, fatigue, wheezing that has been progressive in nature since discharge from this facility on 8/17/2023. Chest x-ray reported 10 mm nodule in the left lower lobe as well as a small right pleural effusion with adjacent consolidation. D-dimer elevated to 1.22. CTA pulmonary protocol was unremarkable for pulmonary embolism but did again reveal concern for neoplasm. During a previous hospitalization she was found to have numerous bilateral solid and groundglass nodules that was suspected to be a metastatic process. At that time she declined further cancer work-up stating she would not be seeking treatment. She has presented again since her initial diagnosis of suspected cancer with a similar presentation of dyspnea fatigue and decreased appetite. At that time we had lengthy discussion regarding home hospice services and hospice services at a nursing facility. She declined those services and opted to discharge home with home health.   She has been alert and oriented and is her own

## 2023-09-05 NOTE — PROGRESS NOTES
Facility/Department: [address] CARE   CLINICAL BEDSIDE SWALLOW EVALUATION    NAME: Sammie Oconnor  : 1932  MRN: [id number]  ADMISSION DATE: 2023  Date of Eval: 2023  Evaluating Therapist: Juliaette Kayser, MS CCC-SLP        ADMITTING DIAGNOSIS:   has Thrombocytopenia ([address]); Iron deficiency anemia; Generalized weakness; COPD (chronic obstructive pulmonary disease) ([address]); Abnormal CT of the chest; History of anemia; History of idiopathic thrombocytopenic purpura; Abnormal chest CT; Shortness of breath; Palliative care patient; Severe malnutrition ([address]); Subacute cough; Weakness; Dyspnea; COPD with acute exacerbation ([address]); Elevated troponin; Abnormal CT scan of lung; Observation for suspected malignant neoplasm; Chronic cough; Atrial fibrillation with RVR ([address]); and Failure to thrive in adult on their problem list.      [address]:   Per APRN notes: The patient is a 80 y.o. female with PMH of multiple pulmonary nodules, Atrial fibrillation, COPD on 2L O2 per NC at baseline, CHF, HTN, ITP and GRACIE who presented to [facility] ED with complaints of worsening shortness of breath. Reported progressively worsening shortness of breath and dyspnea on minimal exertion since last couple months. Reported generalized weakness and unable to care for herself at home. Of note, patient has been admitted multiple times with similar complaints and was discharged from this facility on 2023 after being admitted for COPD exacerbation and Afib RVR. Was discharged home with hospice. Workup in ED revealed hypoxia on arrival (85% on 2L O2 per NC), Afib RVR (120's), Unremarkable chemistry, BNP 7770, Hgb 8.7, Ddimer 1.22, Cxray indicated 10 mm nodule in the left lower lobe, small right pleural effusion with consolidation. CT pulmonary no PE, small right pleural effusion with adjacent consolidation and multiple pulmonary nodules concerning for metastatic disease, significant cardiomegaly.   Patient was admitted overt s/s of aspiration. Clear voicing was observed after all sips and bites. No oral residue or buccal cavity pocketing was noted. No difficulty with oral transit or mastication. Prognosis  Good    Education  Patient Education: Recommended she follow general swallowing precautions and complete daily oral hygiene.   Patient Education Response: Verbalizes understanding                     Electronically Signed By:  Binh Rachel M.S., CCC-SLP  9/5/2023,10:01 AM.

## 2023-09-05 NOTE — PROGRESS NOTES
Physical Therapy  Facility/Department: [facility] PROGRESSIVE CARE  Physical Therapy Initial Assessment    Name: Dandre Cantu  : 1932  MRN: [id number]  Date of Service: 2023    Discharge Recommendations:  Continue to assess pending progress, 24 hour supervision or assist, Patient would benefit from continued therapy after discharge          Patient Diagnosis(es): The primary encounter diagnosis was Atrial fibrillation with RVR ([address]). Diagnoses of Chronic obstructive pulmonary disease, unspecified COPD type ([address]) and Chest pain, unspecified type were also pertinent to this visit. Past Medical History:  has a past medical history of Abdominal aortic aneurysm (AAA) ([address]), Anemia, Anxiety, Arthritis, CHF (congestive heart failure) ([address]), COPD (chronic obstructive pulmonary disease) ([address]), Depression, H/O idiopathic thrombocytopenic purpura, Hypertension, Migraines, and Palliative care patient. Past Surgical History:  has a past surgical history that includes Total abdominal hysterectomy w/ bilateral salpingoophorectomy; bone marrow biopsy; and Colonoscopy (2013). Assessment   Body Structures, Functions, Activity Limitations Requiring Skilled Therapeutic Intervention: Decreased functional mobility ; Decreased ROM; Decreased endurance;Decreased strength;Decreased balance  Assessment: Pt. will benefit from cont. PT to decrease impairments. Pt. needs 24 hr care at this time and would be unable to take care of herself due to deconditioning. Pt. encouraged to so sit up in the chair. Will work on progressive mobility. She needs to use a RW and has to conserve energy due to COPD.   Treatment Diagnosis: impaired gait and mobility  Therapy Prognosis: Good  Decision Making: Medium Complexity  Barriers to Learning: none noted  Requires PT Follow-Up: Yes  Activity Tolerance  Activity Tolerance: Patient limited by fatigue  Activity Tolerance Comments: COPD     Plan   Physcial Therapy Plan  General Plan: 6-7 times per

## 2023-09-06 LAB
BACTERIA BLD CULT ORG #2: NORMAL
BACTERIA BLD CULT: NORMAL

## 2023-09-06 PROCEDURE — 2140000000 HC CCU INTERMEDIATE R&B

## 2023-09-06 PROCEDURE — 6370000000 HC RX 637 (ALT 250 FOR IP): Performed by: PHYSICIAN ASSISTANT

## 2023-09-06 PROCEDURE — 2580000003 HC RX 258: Performed by: NURSE PRACTITIONER

## 2023-09-06 PROCEDURE — 6370000000 HC RX 637 (ALT 250 FOR IP): Performed by: INTERNAL MEDICINE

## 2023-09-06 PROCEDURE — 6370000000 HC RX 637 (ALT 250 FOR IP)

## 2023-09-06 PROCEDURE — 94640 AIRWAY INHALATION TREATMENT: CPT

## 2023-09-06 PROCEDURE — 2700000000 HC OXYGEN THERAPY PER DAY

## 2023-09-06 PROCEDURE — 97110 THERAPEUTIC EXERCISES: CPT

## 2023-09-06 PROCEDURE — 6370000000 HC RX 637 (ALT 250 FOR IP): Performed by: NURSE PRACTITIONER

## 2023-09-06 PROCEDURE — 97116 GAIT TRAINING THERAPY: CPT

## 2023-09-06 PROCEDURE — 99232 SBSQ HOSP IP/OBS MODERATE 35: CPT | Performed by: PHYSICIAN ASSISTANT

## 2023-09-06 PROCEDURE — 94760 N-INVAS EAR/PLS OXIMETRY 1: CPT

## 2023-09-06 RX ADMIN — FERROUS SULFATE TAB 325 MG (65 MG ELEMENTAL FE) 325 MG: 325 (65 FE) TAB at 09:04

## 2023-09-06 RX ADMIN — APIXABAN 2.5 MG: 2.5 TABLET, FILM COATED ORAL at 09:04

## 2023-09-06 RX ADMIN — MEGESTROL ACETATE 200 MG: 40 SUSPENSION ORAL at 20:50

## 2023-09-06 RX ADMIN — CLONAZEPAM 0.5 MG: 0.5 TABLET ORAL at 20:50

## 2023-09-06 RX ADMIN — PANTOPRAZOLE SODIUM 40 MG: 40 TABLET, DELAYED RELEASE ORAL at 05:00

## 2023-09-06 RX ADMIN — ATORVASTATIN CALCIUM 10 MG: 10 TABLET, FILM COATED ORAL at 09:04

## 2023-09-06 RX ADMIN — APIXABAN 2.5 MG: 2.5 TABLET, FILM COATED ORAL at 20:50

## 2023-09-06 RX ADMIN — SODIUM CHLORIDE, PRESERVATIVE FREE 10 ML: 5 INJECTION INTRAVENOUS at 20:50

## 2023-09-06 RX ADMIN — Medication 2 PUFF: at 02:35

## 2023-09-06 RX ADMIN — SODIUM CHLORIDE, PRESERVATIVE FREE 10 ML: 5 INJECTION INTRAVENOUS at 09:07

## 2023-09-06 RX ADMIN — FERROUS SULFATE TAB 325 MG (65 MG ELEMENTAL FE) 325 MG: 325 (65 FE) TAB at 20:50

## 2023-09-06 RX ADMIN — DOCUSATE SODIUM 100 MG: 100 CAPSULE, LIQUID FILLED ORAL at 20:50

## 2023-09-06 RX ADMIN — DILTIAZEM HYDROCHLORIDE 240 MG: 120 CAPSULE, COATED, EXTENDED RELEASE ORAL at 09:03

## 2023-09-06 RX ADMIN — GUAIFENESIN 600 MG: 600 TABLET ORAL at 09:04

## 2023-09-06 RX ADMIN — IPRATROPIUM BROMIDE AND ALBUTEROL SULFATE 1 DOSE: 2.5; .5 SOLUTION RESPIRATORY (INHALATION) at 11:12

## 2023-09-06 RX ADMIN — IPRATROPIUM BROMIDE AND ALBUTEROL SULFATE 1 DOSE: 2.5; .5 SOLUTION RESPIRATORY (INHALATION) at 15:18

## 2023-09-06 RX ADMIN — IPRATROPIUM BROMIDE AND ALBUTEROL SULFATE 1 DOSE: 2.5; .5 SOLUTION RESPIRATORY (INHALATION) at 18:24

## 2023-09-06 RX ADMIN — MEGESTROL ACETATE 200 MG: 40 SUSPENSION ORAL at 09:04

## 2023-09-06 RX ADMIN — GUAIFENESIN 600 MG: 600 TABLET ORAL at 20:50

## 2023-09-06 RX ADMIN — DOCUSATE SODIUM 100 MG: 100 CAPSULE, LIQUID FILLED ORAL at 09:04

## 2023-09-06 RX ADMIN — IPRATROPIUM BROMIDE AND ALBUTEROL SULFATE 1 DOSE: 2.5; .5 SOLUTION RESPIRATORY (INHALATION) at 06:50

## 2023-09-06 ASSESSMENT — ENCOUNTER SYMPTOMS
SORE THROAT: 0
COUGH: 0
ABDOMINAL DISTENTION: 0
SINUS PAIN: 0
VOMITING: 0
ABDOMINAL PAIN: 0
DIARRHEA: 0
SHORTNESS OF BREATH: 1
WHEEZING: 0
CONSTIPATION: 0
NAUSEA: 0
TROUBLE SWALLOWING: 0
BLOOD IN STOOL: 0

## 2023-09-06 ASSESSMENT — PULMONARY FUNCTION TESTS: PEFR_L/MIN: 16

## 2023-09-06 NOTE — PROGRESS NOTES
09/06/23 1608   Subjective   Subjective I am ready. Pain Assessment   Pain Assessment None - Denies Pain   Vitals   Pulse 67   Heart Rate Source Monitor   /73   BP Location Right upper arm   Patient Position Supine   MAP (Calculated) 89   Respirations 24   SpO2 100 %   O2 Device Nasal cannula  (2L at rest gait on 3L)   Bed Mobility Training   Bed Mobility Training Yes   Supine to Sit Contact-guard assistance   Transfer Training   Transfer Training Yes   Sit to Stand Contact-guard assistance   Stand to Sit Contact-guard assistance   Gait Training   Gait Training Yes   Gait   Overall Level of Assistance Minimum assistance  (Cues to stay closer to RW. One brief standing rest break 2 SOA.)   Speed/Maxine Slow   Distance (ft) 65 Feet   Assistive Device Walker, rolling   PT Exercises   A/AROM Exercises BL LE EX in sitting X 10 reps   Other Specialty Interventions   Other Treatments/Modalities In chair alarm attached all needs in reach.    PT Plan of Care   Wednesday X       Electronically signed by Jay Barnes PTA on 9/6/2023 at 4:45 PM

## 2023-09-06 NOTE — PROGRESS NOTES
atorvastatin  10 mg Oral Daily     calcium carbonate, simethicone, morphine 20MG/ML, sodium chloride flush, sodium chloride, ondansetron **OR** ondansetron, polyethylene glycol, acetaminophen **OR** acetaminophen, albuterol sulfate HFA  ADULT ORAL NUTRITION SUPPLEMENT; Breakfast, Dinner; Standard High Calorie/High Protein Oral Supplement  ADULT DIET; Regular; Sherbet every lunch and dinner     Lab and other Data:     No results for input(s): WBC, HGB, PLT in the last 72 hours. No results for input(s): NA, K, CL, CO2, BUN, CREATININE, GLUCOSE in the last 72 hours. RAD:     XR CHEST PORTABLE  Result Date: 8/31/2023     10 mm nodule in the left lower lobe. This is better demonstrated on the CT chest examination from 08/10/2023. Please see that report for further details and additional findings including additional pulmonary nodules. Small right pleural effusion with adjacent consolidation. ______________________________________ Electronically signed by: Nerissa MALDONADO Date:     08/31/2023 Time:    13:48       CTA PULMONARY W CONTRAST  Result Date: 8/31/2023    1. No pulmonary arterial thromboembolism identified. 2.  Multiple complex findings as described including a small right pleural effusion with adjacent consolidation and multiple pulmonary nodules concerning for metastatic disease. Significant cardiomegaly and atherosclerotic disease also noted. Follow-up CT is recommended. - - - - -  All CT scans are performed using dose optimization techniques as appropriate to the performed exam and include at least one of the following: Automated exposure control, adjustment of the mA and/or kV according to size, and the use of iterative reconstruction technique. ______________________________________ Electronically signed by: Clarence Casarez M.D.  Date:     08/31/2023 Time:    17:24         Micro:    Culture, Blood 1 [[id number]] Collected: 08/31/23 1335   Order Status: Completed Specimen: Blood Updated: 09/02/23 0514    Blood Culture, Routine No Growth to date. Any change in status will be called. Culture, Blood 2 [[id number]] Collected: 08/31/23 1325   Order Status: Completed Specimen: Blood Updated: 09/02/23 0514    Culture, Blood 2 No Growth to date. Any change in status will be called. Respiratory Panel, Molecular, with COVID-19 (Restricted: peds pts or suitable admitted adults) [[id number]] Collected: 08/31/23 1342   Order Status: Completed Specimen: Nasopharyngeal Updated: 08/31/23 1455    Adenovirus by PCR Not Detected    Bordetella parapertussis by PCR Not Detected    Bordetella pertussis by PCR Not Detected    Chlamydophilia pneumoniae by PCR Not Detected    Coronavirus 229E by PCR Not Detected    Coronavirus HKU1 by PCR Not Detected    Coronavirus NL63 by PCR Not Detected    Coronavirus OC43 by PCR Not Detected    SARS-CoV-2, PCR Not Detected    Human Metapneumovirus by PCR Not Detected    Human Rhinovirus/Enterovirus by PCR Not Detected    Influenza A by PCR Not Detected    Influenza B by PCR Not Detected    Mycoplasma pneumoniae by PCR Not Detected    Parainfluenza Virus 1 by PCR Not Detected    Parainfluenza Virus 2 by PCR Not Detected    Parainfluenza Virus 3 by PCR Not Detected    Parainfluenza Virus 4 by PCR Not Detected    Respiratory Syncytial Virus by PCR Not Detected     Assessment/Plan   Principal Problem:    Atrial fibrillation with RVR ([facility])  Active Problems:    COPD (chronic obstructive pulmonary disease) ([facility])    History of anemia    Severe malnutrition ([facility])    Failure to thrive in adult  Resolved Problems:    * No resolved hospital problems.  *      Principal Problem:    Atrial fibrillation with RVR ([facility])-   - Cardiology following    - Continue Cardizem 240 mg daily   - Continue Eliquis   - Serial and PRN EKGs  - Monitor on telemetry         Active Problems:    COPD (chronic obstructive pulmonary disease) ([address])-               - continue Bronchodilators               -

## 2023-09-06 NOTE — CARE COORDINATION
Referral sent to [facility] as [facility] is not in network with [employer].   Awaiting acceptance/denialWilbern Lesches   Ph: [phone]  Fa: [phone]  Electronically signed by Howie Julien RN on 9/6/2023 at 2:58 PM

## 2023-09-06 NOTE — PROGRESS NOTES
Palliative Care Progress Note  9/6/2023 3:09 PM    Patient:  Alfrieda Babinski  YOB: 1932  Primary Care Physician: Rubina Bang MD  Advance Directive: DNR  Admit Date: 8/31/2023       Hospital Day: 6  Portions of this note have been copied forward, however, changed to reflect the most current clinical status of this patient. CHIEF COMPLAINT/REASON FOR CONSULTATION Goals of care, family support, Code status, symptom management     SUBJECTIVE:  Ms. Nicholas Pagan has no new complaints with exception of not enjoying her lunch today. HPI:  The patient is a 80 y.o. female with PMH suspected lung cancer w/ known numerous bilateral solid and groundglass nodules previously declining work up, AAA, CHF, COPD dependent on 2L [place] O2, ITP, HTN who presented to [address] [place] [address] ED on 8/31/2023 complaining of shortness of breath, decrease in appetite, fatigue, wheezing that has been progressive in nature since discharge from this facility on 8/17/2023. Chest x-ray reported 10 mm nodule in the left lower lobe as well as a small right pleural effusion with adjacent consolidation. D-dimer elevated to 1.22. CTA pulmonary protocol was unremarkable for pulmonary embolism but did again reveal concern for neoplasm. During a previous hospitalization she was found to have numerous bilateral solid and groundglass nodules that was suspected to be a metastatic process. At that time she declined further cancer work-up stating she would not be seeking treatment. She has presented again since her initial diagnosis of suspected cancer with a similar presentation of dyspnea fatigue and decreased appetite. At that time we had lengthy discussion regarding home hospice services and hospice services at a nursing facility. She declined those services and opted to discharge home with home health. She has been alert and oriented and is her own decision maker with support from her son as her healthcare surrogate.   In ED EKG was concerning for

## 2023-09-06 NOTE — PROGRESS NOTES
Nutrition Assessment     Type and Reason for Visit: Reassess    Nutrition Recommendations/Plan:   Continue ONS BID. Continue Megace at discharge. Malnutrition Assessment:  Malnutrition Status: Severe malnutrition    Nutrition Assessment:  Pt improving from a nutritional standpoint AEB increase in PO intake. Megace has improved pt's appetite. Intake avg now >50% most meals. Wt increase also noted from previous assessment. SLP has evaluated pt swallow and she is recommended to continue regular diet with thin liquids. No other nutritional needs identified at this time. Continue ONS BID. Plans for discharge to SNF pending insurance approval.    Estimated Daily Nutrient Needs:  Energy (kcal):  8707-0428 Weight Used for Energy Requirements: Current     Protein (g):  58-77 Weight Used for Protein Requirements: Current        Fluid (ml/day):  0878-1560 Method Used for Fluid Requirements: 1 ml/kcal    Nutrition Related Findings:   BM 9/4 Wound Type: None    Current Nutrition Therapies:    ADULT ORAL NUTRITION SUPPLEMENT; Breakfast, Dinner; Standard High Calorie/High Protein Oral Supplement  ADULT DIET; Regular;  Sherbet every lunch and dinner    Anthropometric Measures:  Height: 5' 4\" (162.6 cm)  Current Body Wt: 93 lb 9.6 oz (42.5 kg)   BMI: 16.1    Nutrition Diagnosis:   Severe malnutrition related to catabolic illness, impaired respiratory function as evidenced by Criteria as identified in malnutrition assessment    Nutrition Interventions:   Food and/or Nutrient Delivery: Continue Current Diet, Continue Oral Nutrition Supplement  Nutrition Education/Counseling: No recommendation at this time  Coordination of Nutrition Care: Continue to monitor while inpatient       Goals:     Goals: PO intake 50% or greater       Nutrition Monitoring and Evaluation:   Behavioral-Environmental Outcomes: None Identified  Food/Nutrient Intake Outcomes: Food and Nutrient Intake, Supplement Intake  Physical Signs/Symptoms Outcomes: Biochemical Data, Chewing or Swallowing, Weight, Nutrition Focused Physical Findings    Discharge Planning:    Continue Oral Nutrition Supplement, Continue current diet     Judith Parks, MS, RD, LD, CDCES  Contact: [phone]

## 2023-09-07 PROCEDURE — 6370000000 HC RX 637 (ALT 250 FOR IP): Performed by: PHYSICIAN ASSISTANT

## 2023-09-07 PROCEDURE — 99232 SBSQ HOSP IP/OBS MODERATE 35: CPT | Performed by: PHYSICIAN ASSISTANT

## 2023-09-07 PROCEDURE — 6370000000 HC RX 637 (ALT 250 FOR IP): Performed by: NURSE PRACTITIONER

## 2023-09-07 PROCEDURE — 94760 N-INVAS EAR/PLS OXIMETRY 1: CPT

## 2023-09-07 PROCEDURE — 2580000003 HC RX 258: Performed by: NURSE PRACTITIONER

## 2023-09-07 PROCEDURE — 2140000000 HC CCU INTERMEDIATE R&B

## 2023-09-07 PROCEDURE — 97116 GAIT TRAINING THERAPY: CPT

## 2023-09-07 PROCEDURE — 2700000000 HC OXYGEN THERAPY PER DAY

## 2023-09-07 PROCEDURE — 94640 AIRWAY INHALATION TREATMENT: CPT

## 2023-09-07 PROCEDURE — 6370000000 HC RX 637 (ALT 250 FOR IP): Performed by: INTERNAL MEDICINE

## 2023-09-07 PROCEDURE — 6370000000 HC RX 637 (ALT 250 FOR IP)

## 2023-09-07 RX ADMIN — GUAIFENESIN 600 MG: 600 TABLET ORAL at 09:53

## 2023-09-07 RX ADMIN — CLONAZEPAM 0.5 MG: 0.5 TABLET ORAL at 21:03

## 2023-09-07 RX ADMIN — IPRATROPIUM BROMIDE AND ALBUTEROL SULFATE 1 DOSE: 2.5; .5 SOLUTION RESPIRATORY (INHALATION) at 19:19

## 2023-09-07 RX ADMIN — DOCUSATE SODIUM 100 MG: 100 CAPSULE, LIQUID FILLED ORAL at 21:03

## 2023-09-07 RX ADMIN — IPRATROPIUM BROMIDE AND ALBUTEROL SULFATE 1 DOSE: 2.5; .5 SOLUTION RESPIRATORY (INHALATION) at 15:00

## 2023-09-07 RX ADMIN — SODIUM CHLORIDE, PRESERVATIVE FREE 10 ML: 5 INJECTION INTRAVENOUS at 09:53

## 2023-09-07 RX ADMIN — IPRATROPIUM BROMIDE AND ALBUTEROL SULFATE 1 DOSE: 2.5; .5 SOLUTION RESPIRATORY (INHALATION) at 11:01

## 2023-09-07 RX ADMIN — APIXABAN 2.5 MG: 2.5 TABLET, FILM COATED ORAL at 21:03

## 2023-09-07 RX ADMIN — DOCUSATE SODIUM 100 MG: 100 CAPSULE, LIQUID FILLED ORAL at 09:52

## 2023-09-07 RX ADMIN — FERROUS SULFATE TAB 325 MG (65 MG ELEMENTAL FE) 325 MG: 325 (65 FE) TAB at 21:03

## 2023-09-07 RX ADMIN — GUAIFENESIN 600 MG: 600 TABLET ORAL at 21:03

## 2023-09-07 RX ADMIN — DILTIAZEM HYDROCHLORIDE 240 MG: 120 CAPSULE, COATED, EXTENDED RELEASE ORAL at 09:52

## 2023-09-07 RX ADMIN — IPRATROPIUM BROMIDE AND ALBUTEROL SULFATE 1 DOSE: 2.5; .5 SOLUTION RESPIRATORY (INHALATION) at 06:50

## 2023-09-07 RX ADMIN — MEGESTROL ACETATE 200 MG: 40 SUSPENSION ORAL at 21:03

## 2023-09-07 RX ADMIN — PANTOPRAZOLE SODIUM 40 MG: 40 TABLET, DELAYED RELEASE ORAL at 06:20

## 2023-09-07 RX ADMIN — APIXABAN 2.5 MG: 2.5 TABLET, FILM COATED ORAL at 09:52

## 2023-09-07 RX ADMIN — MEGESTROL ACETATE 200 MG: 40 SUSPENSION ORAL at 09:52

## 2023-09-07 RX ADMIN — IPRATROPIUM BROMIDE AND ALBUTEROL SULFATE 1 DOSE: 2.5; .5 SOLUTION RESPIRATORY (INHALATION) at 00:20

## 2023-09-07 RX ADMIN — FERROUS SULFATE TAB 325 MG (65 MG ELEMENTAL FE) 325 MG: 325 (65 FE) TAB at 09:52

## 2023-09-07 RX ADMIN — ATORVASTATIN CALCIUM 10 MG: 10 TABLET, FILM COATED ORAL at 09:53

## 2023-09-07 ASSESSMENT — ENCOUNTER SYMPTOMS
WHEEZING: 0
SHORTNESS OF BREATH: 1
CONSTIPATION: 0
SORE THROAT: 0
ABDOMINAL PAIN: 0
VOMITING: 0
NAUSEA: 0
DIARRHEA: 0
SINUS PAIN: 0
COUGH: 0
TROUBLE SWALLOWING: 0
BLOOD IN STOOL: 0
ABDOMINAL DISTENTION: 0

## 2023-09-07 NOTE — PROGRESS NOTES
[facility] Hospitalists      Progress Note    Patient:  Brandon Gannon  YOB: 1932  Date of Service: 9/7/2023  MRN: [id number]   Acct: [de-identified]   Primary Care Physician: Ashlie Jackson MD  Advance Directive: DNR  Admit Date: 8/31/2023       Hospital Day: 7    Portions of this note have been copied forward, however, updated to reflect the most current clinical status of this patient. CHIEF COMPLAINT Shortness of breath     SUBJECTIVE:  Ms. Anna Phillip was resting in bed this morning. Reported SOB last night. Denies SOB or chest pain currently. CUMULATIVE HOSPITAL COURSE:   The patient is a 80 y.o. female with PMH of multiple pulmonary nodules, Atrial fibrillation, COPD on 2L O2 per NC at baseline, CHF, HTN, ITP and GRACIE who presented to [facility] ED with complaints of worsening shortness of breath. Reported progressively worsening shortness of breath and dyspnea on minimal exertion since last couple months. Reported generalized weakness and unable to care for herself at home. Of note, patient has been admitted multiple times with similar complaints and was discharged from this facility on 8/17/2023 after being admitted for COPD exacerbation and Afib RVR. Was discharged home with hospice. Workup in ED revealed hypoxia on arrival (85% on 2L O2 per NC), Afib RVR (120's), Unremarkable chemistry, BNP 7770, Hgb 8.7, Ddimer 1.22, Cxray indicated 10 mm nodule in the left lower lobe, small right pleural effusion with consolidation. CT pulmonary no PE, small right pleural effusion with adjacent consolidation and multiple pulmonary nodules concerning for metastatic disease, significant cardiomegaly. Patient was admitted to hospital medicine for further evaluation with cardiology consultation. Cardizem drip initiated in ED. Cardiology recommended switching Toprol-XL to Cardizem 120 mg daily and Eliquis 2.5 mg twice daily given elevated [id number] score. Hospice was consulted. Hospice will follow outpatient at SNF.

## 2023-09-07 NOTE — PROGRESS NOTES
Physical Therapy  Name: Mahin Mistry  MRN:  [id number]  Date of service:  9/7/2023 09/07/23 1204   Restrictions/Precautions   Restrictions/Precautions Fall Risk   General   Family / Caregiver Present No   Referring Practitioner ROSA ELENA Leonard CNP   Subjective   Subjective It is so cold. Oxygen Therapy   O2 Device Nasal cannula   O2 Flow Rate (L/min) 3 L/min   Bed Mobility   Supine to Sit Contact guard assistance   Transfers   Sit to Stand Minimal Assistance   Stand to Sit Minimal Assistance   Ambulation   Surface Level tile   Device Rolling Walker   Other Apparatus O2   Assistance Minimal assistance   Gait Deviations Slow Maxine;Decreased step length;Decreased step height   Distance 45 feet   Short Term Goals   Time Frame for Short Term Goals 2 wks   Short Term Goal 1 supine to sit indep   Short Term Goal 2 sit to stand indep   Short Term Goal 3 amb. 100' with RW SBA   Short Term Goal 4 bed to chair SBA   Conditions Requiring Skilled Therapeutic Intervention   Body Structures, Functions, Activity Limitations Requiring Skilled Therapeutic Intervention Decreased functional mobility ; Decreased ROM; Decreased endurance;Decreased strength;Decreased balance   Treatment Diagnosis impaired gait and mobility   Discharge Recommendations Continue to assess pending progress;24 hour supervision or assist;Patient would benefit from continued therapy after discharge   Activity Tolerance   Activity Tolerance Patient limited by fatigue   Activity Tolerance Comments COPD   Physcial Therapy Plan   General Plan 6-7 times per week   Therapy Duration 2 Weeks   Current Treatment Recommendations Strengthening;Balance training;Functional mobility training;Transfer training;Gait training; Endurance training;Patient/Caregiver education & training; Safety education & training;Positioning;Equipment evaluation, education, & procurement; Therapeutic activities   PT Plan of Care   Thursday X   Safety Devices   Type of Devices Left in chair;Call light within reach         Electronically signed by Verna Ceballos PTA on 9/7/2023 at 12:06 PM

## 2023-09-07 NOTE — CARE COORDINATION
[facility] pt for admission and insurance has approved. Pt can discharge tomorrow 9/8/23 if medically stable.   [facility]   Ph: [phone]  Fa: [phone]  Electronically signed by Lobito Villanueva RN on 9/7/2023 at 9:30 AM

## 2023-09-07 NOTE — PROGRESS NOTES
Palliative Care Progress Note  9/7/2023 9:04 AM    Patient:  Irene Samayoa  YOB: 1932  Primary Care Physician: Jenn Mcneill MD  Advance Directive: DNR  Admit Date: 8/31/2023       Hospital Day: 7  Portions of this note have been copied forward, however, changed to reflect the most current clinical status of this patient. CHIEF COMPLAINT/REASON FOR CONSULTATION Goals of care, family support, Code status, symptom management     SUBJECTIVE:  Ms. Lena Prabhakar has no new complaints today. States she slept well last night. Denies pain. Did wake up at some point w/ dyspnea and requested breathing treatment. Appetite remains improved overall. HPI:  The patient is a 80 y.o. female with PMH suspected lung cancer w/ known numerous bilateral solid and groundglass nodules previously declining work up, AAA, CHF, COPD dependent on 2L [place] O2, ITP, HTN who presented to [facility] ED on 8/31/2023 complaining of shortness of breath, decrease in appetite, fatigue, wheezing that has been progressive in nature since discharge from this facility on 8/17/2023. Chest x-ray reported 10 mm nodule in the left lower lobe as well as a small right pleural effusion with adjacent consolidation. D-dimer elevated to 1.22. CTA pulmonary protocol was unremarkable for pulmonary embolism but did again reveal concern for neoplasm. During a previous hospitalization she was found to have numerous bilateral solid and groundglass nodules that was suspected to be a metastatic process. At that time she declined further cancer work-up stating she would not be seeking treatment. She has presented again since her initial diagnosis of suspected cancer with a similar presentation of dyspnea fatigue and decreased appetite. At that time we had lengthy discussion regarding home hospice services and hospice services at a nursing facility. She declined those services and opted to discharge home with home health.   She has been alert and oriented and is 325 mg Oral BID    guaiFENesin  600 mg Oral BID    ipratropium 0.5 mg-albuterol 2.5 mg  1 Dose Inhalation Q4H WA RT    pantoprazole  40 mg Oral QAM AC    atorvastatin  10 mg Oral Daily     calcium carbonate, simethicone, morphine 20MG/ML, sodium chloride flush, sodium chloride, ondansetron **OR** ondansetron, polyethylene glycol, acetaminophen **OR** acetaminophen, albuterol sulfate HFA  ADULT ORAL NUTRITION SUPPLEMENT; Breakfast, Dinner; Standard High Calorie/High Protein Oral Supplement  ADULT DIET; Regular; Sherbet every lunch and dinner       Palliative Performance Scale:  40-50%    ECOG:(3) Capable of limited self-care, confined to bed or chair > 50% of waking hours    CLINICAL PAIN ASSESSMENT:     Score 1-10 (if verbal):  0    Assessment/Plan   Principal Problem:    Atrial fibrillation with RVR ([facility])  Active Problems:    COPD (chronic obstructive pulmonary disease) ([facility])    History of anemia    Severe malnutrition ([facility])    Failure to thrive in adult  Resolved Problems:    * No resolved hospital problems. *    Visit Summary:  Chart reviewed. No acute overnight events. However [facility] with patient's insurance and she is now awaiting placement at [facility]. She states that her son is assisting with moving her things and also assisting with financials. She has no new complaints but did wake up overnight with dyspnea and states she had to wait a long time for breathing treatment. She feels that the nebulizer treatments have been beneficial for her. We discussed the use of morphine as needed for air hunger. She continues to have improved appetite with Megace. She denies constipation or diarrhea her last bowel movement was yesterday. Colace has been continued. She denies pain. She states reflux symptoms are controlled and she does have Tums available as well as a PPI. We will plan to see as needed at this point.     Recommendations:   Palliative Care-GOC Placement at [facility] w/

## 2023-09-08 VITALS
BODY MASS INDEX: 16.79 KG/M2 | HEART RATE: 96 BPM | TEMPERATURE: 97.3 F | SYSTOLIC BLOOD PRESSURE: 134 MMHG | HEIGHT: 64 IN | WEIGHT: 98.38 LBS | OXYGEN SATURATION: 100 % | DIASTOLIC BLOOD PRESSURE: 73 MMHG | RESPIRATION RATE: 14 BRPM

## 2023-09-08 LAB
EKG P AXIS: 68 DEGREES
EKG P AXIS: NORMAL DEGREES
EKG P-R INTERVAL: 148 MS
EKG P-R INTERVAL: NORMAL MS
EKG Q-T INTERVAL: 308 MS
EKG Q-T INTERVAL: 308 MS
EKG QRS DURATION: 68 MS
EKG QRS DURATION: 72 MS
EKG QTC CALCULATION (BAZETT): 421 MS
EKG QTC CALCULATION (BAZETT): 430 MS
EKG T AXIS: 63 DEGREES
EKG T AXIS: 69 DEGREES

## 2023-09-08 PROCEDURE — 97535 SELF CARE MNGMENT TRAINING: CPT

## 2023-09-08 PROCEDURE — 6370000000 HC RX 637 (ALT 250 FOR IP): Performed by: NURSE PRACTITIONER

## 2023-09-08 PROCEDURE — 94640 AIRWAY INHALATION TREATMENT: CPT

## 2023-09-08 PROCEDURE — 2700000000 HC OXYGEN THERAPY PER DAY

## 2023-09-08 PROCEDURE — 94760 N-INVAS EAR/PLS OXIMETRY 1: CPT

## 2023-09-08 PROCEDURE — 97530 THERAPEUTIC ACTIVITIES: CPT

## 2023-09-08 PROCEDURE — 2580000003 HC RX 258: Performed by: NURSE PRACTITIONER

## 2023-09-08 PROCEDURE — 6370000000 HC RX 637 (ALT 250 FOR IP)

## 2023-09-08 PROCEDURE — 6370000000 HC RX 637 (ALT 250 FOR IP): Performed by: PHYSICIAN ASSISTANT

## 2023-09-08 PROCEDURE — 6370000000 HC RX 637 (ALT 250 FOR IP): Performed by: INTERNAL MEDICINE

## 2023-09-08 RX ORDER — SIMETHICONE 80 MG
80 TABLET,CHEWABLE ORAL EVERY 6 HOURS PRN
Qty: 30 TABLET | Refills: 0 | Status: SHIPPED | OUTPATIENT
Start: 2023-09-08

## 2023-09-08 RX ORDER — MEGESTROL ACETATE 40 MG/ML
200 SUSPENSION ORAL 2 TIMES DAILY
Qty: 240 ML | Refills: 0 | Status: SHIPPED | OUTPATIENT
Start: 2023-09-08

## 2023-09-08 RX ORDER — CLONAZEPAM 0.5 MG/1
0.5 TABLET ORAL NIGHTLY
Qty: 3 TABLET | Refills: 0 | Status: ON HOLD | OUTPATIENT
Start: 2023-09-08 | End: 2023-09-13

## 2023-09-08 RX ORDER — DILTIAZEM HYDROCHLORIDE 240 MG/1
240 CAPSULE, COATED, EXTENDED RELEASE ORAL DAILY
Qty: 30 CAPSULE | Refills: 0 | Status: SHIPPED | OUTPATIENT
Start: 2023-09-09

## 2023-09-08 RX ADMIN — PANTOPRAZOLE SODIUM 40 MG: 40 TABLET, DELAYED RELEASE ORAL at 06:11

## 2023-09-08 RX ADMIN — IPRATROPIUM BROMIDE AND ALBUTEROL SULFATE 1 DOSE: 2.5; .5 SOLUTION RESPIRATORY (INHALATION) at 10:21

## 2023-09-08 RX ADMIN — IPRATROPIUM BROMIDE AND ALBUTEROL SULFATE 1 DOSE: 2.5; .5 SOLUTION RESPIRATORY (INHALATION) at 14:28

## 2023-09-08 RX ADMIN — FERROUS SULFATE TAB 325 MG (65 MG ELEMENTAL FE) 325 MG: 325 (65 FE) TAB at 08:56

## 2023-09-08 RX ADMIN — SODIUM CHLORIDE, PRESERVATIVE FREE 10 ML: 5 INJECTION INTRAVENOUS at 08:56

## 2023-09-08 RX ADMIN — IPRATROPIUM BROMIDE AND ALBUTEROL SULFATE 1 DOSE: 2.5; .5 SOLUTION RESPIRATORY (INHALATION) at 06:52

## 2023-09-08 RX ADMIN — DOCUSATE SODIUM 100 MG: 100 CAPSULE, LIQUID FILLED ORAL at 08:56

## 2023-09-08 RX ADMIN — GUAIFENESIN 600 MG: 600 TABLET ORAL at 08:56

## 2023-09-08 RX ADMIN — MEGESTROL ACETATE 200 MG: 40 SUSPENSION ORAL at 08:56

## 2023-09-08 RX ADMIN — DILTIAZEM HYDROCHLORIDE 240 MG: 120 CAPSULE, COATED, EXTENDED RELEASE ORAL at 08:55

## 2023-09-08 RX ADMIN — APIXABAN 2.5 MG: 2.5 TABLET, FILM COATED ORAL at 08:56

## 2023-09-08 RX ADMIN — ATORVASTATIN CALCIUM 10 MG: 10 TABLET, FILM COATED ORAL at 08:56

## 2023-09-08 NOTE — PROGRESS NOTES
Occupational Therapy     09/08/23 1130   Subjective   Subjective Pt sitting up in recliner upon arrival for therapy and needs to use the bathroom. Pain Assessment   Pain Assessment None - Denies Pain   Vitals   O2 Device Nasal cannula   Cognition   Overall Cognitive Status WFL   Orientation   Overall Orientation Status WFL   Bed Mobility Training   Bed Mobility Training No   Transfer Training   Transfer Training Yes   Overall Level of Assistance Contact-guard assistance   Interventions Verbal cues; Tactile cues   Sit to Stand Contact-guard assistance   Stand to Sit Contact-guard assistance   Toilet Transfer Contact-guard assistance  [facility])   Balance   Sitting Intact   Standing With support  (RW)   ADL   Feeding Setup; Independent   Grooming Setup; Independent   UE Bathing Supervision   LE Bathing Minimal assistance   UE Dressing Supervision   LE Dressing Minimal assistance   Toileting Minimal assistance   Assessment   Assessment Tx focused on BSC use. Pt wanted to stay up in recliner for lunch. All needs in reach with call light donned.    Activity Tolerance Patient tolerated treatment well   Discharge Recommendations Patient would benefit from continued therapy after discharge   Occupational Therapy Plan   Times Per Week 3-5x/week   Times Per Day Once a day   OT Plan of Care   Friday X     Electronically signed by ESTEBAN Walls on 9/8/2023 at 4:53 PM

## 2023-09-08 NOTE — PLAN OF CARE
Problem: Discharge Planning  Goal: Discharge to home or other facility with appropriate resources  Outcome: Completed     Problem: Skin/Tissue Integrity  Goal: Absence of new skin breakdown  Description: 1. Monitor for areas of redness and/or skin breakdown  2. Assess vascular access sites hourly  3. Every 4-6 hours minimum:  Change oxygen saturation probe site  4. Every 4-6 hours:  If on nasal continuous positive airway pressure, respiratory therapy assess nares and determine need for appliance change or resting period.   Outcome: Completed     Problem: Safety - Adult  Goal: Free from fall injury  Outcome: Completed     Problem: ABCDS Injury Assessment  Goal: Absence of physical injury  Outcome: Completed     Problem: Chronic Conditions and Co-morbidities  Goal: Patient's chronic conditions and co-morbidity symptoms are monitored and maintained or improved  Outcome: Completed     Problem: Nutrition Deficit:  Goal: Optimize nutritional status  Outcome: Completed     Problem: Pain  Goal: Verbalizes/displays adequate comfort level or baseline comfort level  Outcome: Completed

## 2023-09-08 NOTE — PROGRESS NOTES
Physical Therapy  Name: Beka Hurley  MRN:  [id number]  Date of service:  9/8/2023 09/08/23 1258   Restrictions/Precautions   Restrictions/Precautions Fall Risk   General   Chart Reviewed Yes   Family / Caregiver Present No   Subjective   Subjective Patient is sitting up in recliner and states she needs to use that bathroom. Pain Assessment   Pain Assessment None - Denies Pain   Transfers   Sit to Stand Contact guard assistance;Minimal Assistance   Stand to Sit Contact guard assistance   Stand Pivot Transfers Contact guard assistance   Patient Goals    Patient Goals  go home   Short Term Goals   Time Frame for Short Term Goals 2 wks   Short Term Goal 1 supine to sit indep   Short Term Goal 2 sit to stand indep   Short Term Goal 3 amb. 100' with RW SBA   Short Term Goal 4 bed to chair SBA   Conditions Requiring Skilled Therapeutic Intervention   Body Structures, Functions, Activity Limitations Requiring Skilled Therapeutic Intervention Decreased functional mobility ; Decreased ROM; Decreased endurance;Decreased strength;Decreased balance   Assessment Patient able to transfer to and from [facility] and stand for clean up without LOB. Declines back to bed at this time stating she needs to sit up to eat lunch. She was repositioned in her recliner with all needs in reach. Will continue to follow and progress as able. PT Plan of Care   Friday X   Safety Devices   Type of Devices Gait belt; Chair alarm in place;Call light within reach; Left in chair       Electronically signed by Maddison Thao PTA on 9/8/2023 at 1:06 PM

## 2023-09-08 NOTE — CARE COORDINATION
Raheel Ramirez met with the Pt at bedside to discuss the Pt IMM (2nd) letter and to answer any questions/concerns at that time. The Pt did not have any questions/concerns at this time. The Pt waived the four hour wait period and this was filed in the Pt soft chart. The  provided the IMM (2nd) letter for the Pt for their personal records.     09/08/23 1415   IMM Letter   IMM Letter given to Patient/Family/Significant other/Guardian/POA/by: Nicole Layton    IMM Letter date given: 09/08/23   IMM Letter time given: 9394

## 2023-09-08 NOTE — PROGRESS NOTES
Spoke to the pts son per phone. He states the pt is going to the SNF skilled and will not be utilizing Hospice services at this time. Hospice will follow and offer services after the skilled days are exhausted.

## 2023-09-08 NOTE — DISCHARGE SUMMARY
[facility]    Discharge Summary      Daniel Ferraro  :  1932  MRN:  [id number]    Admit date:  2023  Discharge date:     2023    Discharging Physician:  Dr. Sundar Wright Directive: DNR    Consults: cardiology    Primary Care Physician:  Aisha Zapata MD    Discharge Diagnoses:  Principal Problem:    Atrial fibrillation with RVR ([address])  Active Problems:    COPD (chronic obstructive pulmonary disease) ([address])    History of anemia    Severe malnutrition ([address])    Failure to thrive in adult  Resolved Problems:    * No resolved hospital problems. *      Portions of this note have been copied forward, however, changed to reflect the most current clinical status of this patient. Hospital Course: The patient is a 80 y.o. female with PMH of multiple pulmonary nodules, Atrial fibrillation, COPD on 2L O2 per NC at baseline, CHF, HTN, ITP and GRACIE who presented to [address] [place] [address] ED with complaints of worsening shortness of breath. Reported progressively worsening shortness of breath and dyspnea on minimal exertion since last couple months. Reported generalized weakness and unable to care for herself at home. Of note, patient has been admitted multiple times with similar complaints and was discharged from this facility on 2023 after being admitted for COPD exacerbation and Afib RVR. Was discharged home with hospice. Workup in ED revealed hypoxia on arrival (85% on 2L O2 per NC), Afib RVR (120's), Unremarkable chemistry, BNP 7770, Hgb 8.7, Ddimer 1.22, Cxray indicated 10 mm nodule in the left lower lobe, small right pleural effusion with consolidation. CT pulmonary no PE, small right pleural effusion with adjacent consolidation and multiple pulmonary nodules concerning for metastatic disease, significant cardiomegaly. Patient was admitted to hospital medicine for further evaluation with cardiology consultation. Cardizem drip initiated in ED.   Cardiology recommended switching Toprol-XL to 0.5-2.5 (3) MG/3ML Soln nebulizer solution  Commonly known as: DUONEB  Inhale 3 mLs into the lungs every 4 hours (while awake)     lidocaine 5 %  Commonly known as: Lidoderm  Place 1 patch onto the skin daily 12 hours on, 12 hours off.     pantoprazole 40 MG tablet  Commonly known as: PROTONIX  Take 1 tablet by mouth every morning (before breakfast)     simvastatin 20 MG tablet  Commonly known as: ZOCOR     Vitamin D3 125 MCG (5000 UT) Tabs            STOP taking these medications      amLODIPine 5 MG tablet  Commonly known as: NORVASC     metoprolol succinate 25 MG extended release tablet  Commonly known as: TOPROL XL               Where to Get Your Medications        Information about where to get these medications is not yet available    Ask your nurse or doctor about these medications  apixaban 2.5 MG Tabs tablet  clonazePAM 0.5 MG tablet  dilTIAZem 240 MG extended release capsule  megestrol 40 MG/ML suspension  simethicone 80 MG chewable tablet            Discharge Instructions: Follow up with Aidan Mauricio MD in 7 days. Take medications as directed. Resume activity as tolerated. Marilyn Abdi, [address]  [phone]    Follow up on 10/5/2023  11:30 am Cardiology [address] [address] [phone]    Follow up in 1 week(s)           Diet: ADULT ORAL NUTRITION SUPPLEMENT; Breakfast, Dinner; Standard High Calorie/High Protein Oral Supplement  ADULT DIET; Regular; Sherbet every lunch and dinner     Disposition: Patient is medically stable and will be discharged to SNF. Time spent on discharge 38 minutes spent in assessing patient, reviewing medications, discussion with nursing, confirming safe discharge plan and preparation of discharge summary.     Signed:  Electronically signed by ROSA ELENA Shelton CNP on 9/8/23 at 12:31 PM CDT         EMR Dragon/Transcription disclaimer:   Much of this encounter

## 2023-09-08 NOTE — CARE COORDINATION
09/08/23 1148   Readmission Assessment   Number of Days since last admission? 8-30 days   Previous Disposition Home Alone   Who is being Interviewed Patient   What was the patient's/caregiver's perception as to why they think they needed to return back to the hospital?   (Pt needed to seek medical attention)   Did you visit your Primary Care Physician after you left the hospital, before you returned this time? No  (Pt denies)   Why weren't you able to visit your PCP? Other (Comment)  (Pt denies)   Did you see a specialist, such as Cardiac, Pulmonary, Orthopedic Physician, etc. after you left the hospital? No  (Pt denies)   Who advised the patient to return to the hospital? Self-referral   Does the patient report anything that got in the way of taking their medications? No  (Pt denies)   In our efforts to provide the best possible care to you and others like you, can you think of anything that we could have done to help you after you left the hospital the first time, so that you might not have needed to return so soon?  Other (Comment)  (Pt denies)

## 2023-09-08 NOTE — CARE COORDINATION
Case Management Assessment  Initial Evaluation    Date/Time of Evaluation: 9/8/2023 11:45 AM  Assessment Completed by: Chyna Earl    If patient is discharged prior to next notation, then this note serves as note for discharge by case management. Patient Name: Scott Kay                   YOB: 1932  Diagnosis: Atrial fibrillation with RVR ([address]) [I48.91]  Chronic obstructive pulmonary disease, unspecified COPD type ([address]) [J44.9]  Chest pain, unspecified type [R07.9]                   Date / Time: 8/31/2023 12:19 PM    Patient Admission Status: Inpatient   Readmission Risk (Low < 19, Mod (19-27), High > 27): Readmission Risk Score: 15.7    Current PCP: Darryn Langley MD  PCP verified by CM? (P) Yes    Chart Reviewed: Yes      History Provided by: (P) Patient, Medical Record  Patient Orientation: (P) Alert and Oriented    Patient Cognition: (P) Alert    Hospitalization in the last 30 days (Readmission):  Yes    If yes, Readmission Assessment in  Navigator will be completed.     Advance Directives:      Code Status: DNR   Patient's Primary Decision Maker is: (P) Legal Next of Kin (Alfonso Alicia)    Primary Decision Maker: Chin Escalante/Arely  Child - [phone]    Discharge Planning:    Patient lives with: (P) Other (Comment) (SNF) Type of Home: (P) [address] [place] [address]  Primary Care Giver: (P) Self  Patient Support Systems include: (P) Children, [employer]   Current Financial resources: (P) Medicare  Current community resources: (P) Other (Comment) (SNF)  Current services prior to admission: (P) Durable Medical Equipment, C-pap, Oxygen Therapy            Current DME: (P) Oxygen Therapy (Comment), Cane, Cpap, Shower Chair, Walker            Type of Home Care services:       ADLS  Prior functional level: (P) Independent in ADLs/IADLs  Current functional level: (P) Assistance with the following:, Bathing, Dressing, Toileting, Mobility    PT AM-PAC:   /24  OT AM-PAC: /24    Family can provide assistance at DC: (P) Yes  Would you like Case Management to discuss the discharge plan with any other family members/significant others, and if so, who? (P) Yes (Son Marissa Jasso)  Plans to Return to Present Housing: (P) Yes  Other Identified Issues/Barriers to RETURNING to current housing: Pt will go live in a SNF facility per Pt. Pt reported her son Marissa Jasso will provide transportation for her at D/C.    Potential Assistance needed at discharge: (P) [address]            Potential DME:  Pt denies   Patient expects to discharge to: (P) [address] for transportation at discharge: (P) Family (Pt son Marissa Jasso)    Financial    Payor: Og Crane / Plan: Stevie Langston / Product Type: *No Product type* /     Does insurance require precert for SNF: Yes    Potential assistance Purchasing Medications: (P) No (Pt denies)  Meds-to-Beds request: No      [facility] [address] Dr. Kathryn Mcmanus [phone] - f [phone]  [address] [place] [address]  [address] [place] [address]  Phone: [phone] Fax: [facility], [address] [place] [address] 150 [facility] [phone] - F [phone]  [address] [place] [address]  Phone: [phone] Fax: [facility], [facility] [address] [place] [address] [address] Katie Vora [phone] Kerry Coelho [phone]  [address] [address]  Phone: [phone] Fax: [address] [phone] - f [phone]  [address] [place] [address] [address] [address]  Phone: [phone] Fax: [phone]      Notes:    Factors facilitating achievement of predicted outcomes: Family support, Motivated, Cooperative, Pleasant, Sense of humor, Good insight into deficits, Has needed Durable Medical Equipment at home, and Home is wheelchair accessible    Barriers to discharge: Pain    Additional

## 2023-09-11 ENCOUNTER — APPOINTMENT (OUTPATIENT)
Dept: GENERAL RADIOLOGY | Age: 88
DRG: 377 | End: 2023-09-11
Payer: MEDICARE

## 2023-09-11 ENCOUNTER — HOSPITAL ENCOUNTER (INPATIENT)
Age: 88
LOS: 2 days | Discharge: NURSING, REHAB OR LONG-TERM CARE | DRG: 377 | End: 2023-09-13
Attending: EMERGENCY MEDICINE | Admitting: HOSPITALIST
Payer: MEDICARE

## 2023-09-11 DIAGNOSIS — K92.2 GASTROINTESTINAL HEMORRHAGE, UNSPECIFIED GASTROINTESTINAL HEMORRHAGE TYPE: Primary | ICD-10-CM

## 2023-09-11 DIAGNOSIS — G47.09 OTHER INSOMNIA: ICD-10-CM

## 2023-09-11 PROBLEM — D64.9 ACUTE ON CHRONIC ANEMIA: Status: ACTIVE | Noted: 2023-09-11

## 2023-09-11 PROBLEM — E87.5 HYPERKALEMIA: Status: ACTIVE | Noted: 2023-09-11

## 2023-09-11 LAB
ALBUMIN SERPL-MCNC: 3.7 G/DL (ref 3.5–5.2)
ALP SERPL-CCNC: 48 U/L (ref 35–104)
ALT SERPL-CCNC: 8 U/L (ref 5–33)
ANION GAP SERPL CALCULATED.3IONS-SCNC: 6 MMOL/L (ref 7–19)
APTT PPP: 28.8 SEC (ref 26–36.2)
AST SERPL-CCNC: 14 U/L (ref 5–32)
BASO STIPL BLD QL SMEAR: ABNORMAL
BASOPHILS # BLD: 0.1 K/UL (ref 0–0.2)
BASOPHILS NFR BLD: 0.7 % (ref 0–1)
BILIRUB SERPL-MCNC: <0.2 MG/DL (ref 0.2–1.2)
BUN SERPL-MCNC: 44 MG/DL (ref 8–23)
CALCIUM SERPL-MCNC: 9.3 MG/DL (ref 8.2–9.6)
CHLORIDE SERPL-SCNC: 102 MMOL/L (ref 98–111)
CO2 SERPL-SCNC: 33 MMOL/L (ref 22–29)
CREAT SERPL-MCNC: 1.3 MG/DL (ref 0.5–0.9)
EOSINOPHIL # BLD: 0.1 K/UL (ref 0–0.6)
EOSINOPHIL NFR BLD: 0.9 % (ref 0–5)
ERYTHROCYTE [DISTWIDTH] IN BLOOD BY AUTOMATED COUNT: 18 % (ref 11.5–14.5)
GLUCOSE SERPL-MCNC: 241 MG/DL (ref 74–109)
HCT VFR BLD AUTO: 25.9 % (ref 37–47)
HCT VFR BLD AUTO: 26.2 % (ref 37–47)
HGB BLD-MCNC: 7.1 G/DL (ref 12–16)
HYPOCHROMIA BLD QL SMEAR: ABNORMAL
IMM GRANULOCYTES # BLD: 0.1 K/UL
INR PPP: 1.48 (ref 0.88–1.18)
IRON SATN MFR SERPL: 22 % (ref 14–50)
IRON SERPL-MCNC: 84 UG/DL (ref 37–145)
LYMPHOCYTES # BLD: 0.6 K/UL (ref 1.1–4.5)
LYMPHOCYTES NFR BLD: 9.2 % (ref 20–40)
MCH RBC QN AUTO: 27.2 PG (ref 27–31)
MCHC RBC AUTO-ENTMCNC: 27.1 G/DL (ref 33–37)
MCV RBC AUTO: 100.4 FL (ref 81–99)
MONOCYTES # BLD: 0.9 K/UL (ref 0–0.9)
MONOCYTES NFR BLD: 14 % (ref 0–10)
NEUTROPHILS # BLD: 5 K/UL (ref 1.5–7.5)
NEUTS SEG NFR BLD: 74.5 % (ref 50–65)
OVALOCYTES BLD QL SMEAR: ABNORMAL
PLATELET # BLD AUTO: 464 K/UL (ref 130–400)
PLATELET SLIDE REVIEW: ABNORMAL
PMV BLD AUTO: 9.1 FL (ref 9.4–12.3)
POLYCHROMASIA BLD QL SMEAR: ABNORMAL
POTASSIUM SERPL-SCNC: 5.4 MMOL/L (ref 3.5–5)
PROT SERPL-MCNC: 6.1 G/DL (ref 6.6–8.7)
PROTHROMBIN TIME: 17.5 SEC (ref 12–14.6)
RBC # BLD AUTO: 2.61 M/UL (ref 4.2–5.4)
RETICS # AUTO: 0.15 M/UL (ref 0.03–0.12)
RETICS/RBC NFR: 5.73 % (ref 0.5–1.5)
SODIUM SERPL-SCNC: 141 MMOL/L (ref 136–145)
TIBC SERPL-MCNC: 375 UG/DL (ref 250–400)
WBC # BLD AUTO: 6.7 K/UL (ref 4.8–10.8)

## 2023-09-11 PROCEDURE — 85730 THROMBOPLASTIN TIME PARTIAL: CPT

## 2023-09-11 PROCEDURE — 80053 COMPREHEN METABOLIC PANEL: CPT

## 2023-09-11 PROCEDURE — 71045 X-RAY EXAM CHEST 1 VIEW: CPT

## 2023-09-11 PROCEDURE — 85610 PROTHROMBIN TIME: CPT

## 2023-09-11 PROCEDURE — 36415 COLL VENOUS BLD VENIPUNCTURE: CPT

## 2023-09-11 PROCEDURE — 93005 ELECTROCARDIOGRAM TRACING: CPT | Performed by: EMERGENCY MEDICINE

## 2023-09-11 PROCEDURE — 1210000000 HC MED SURG R&B

## 2023-09-11 PROCEDURE — 86850 RBC ANTIBODY SCREEN: CPT

## 2023-09-11 PROCEDURE — 82607 VITAMIN B-12: CPT

## 2023-09-11 PROCEDURE — 83540 ASSAY OF IRON: CPT

## 2023-09-11 PROCEDURE — 82746 ASSAY OF FOLIC ACID SERUM: CPT

## 2023-09-11 PROCEDURE — P9016 RBC LEUKOCYTES REDUCED: HCPCS

## 2023-09-11 PROCEDURE — 82728 ASSAY OF FERRITIN: CPT

## 2023-09-11 PROCEDURE — 85025 COMPLETE CBC W/AUTO DIFF WBC: CPT

## 2023-09-11 PROCEDURE — 86923 COMPATIBILITY TEST ELECTRIC: CPT

## 2023-09-11 PROCEDURE — 85045 AUTOMATED RETICULOCYTE COUNT: CPT

## 2023-09-11 PROCEDURE — 83550 IRON BINDING TEST: CPT

## 2023-09-11 PROCEDURE — 83615 LACTATE (LD) (LDH) ENZYME: CPT

## 2023-09-11 PROCEDURE — 99285 EMERGENCY DEPT VISIT HI MDM: CPT

## 2023-09-11 PROCEDURE — 83010 ASSAY OF HAPTOGLOBIN QUANT: CPT

## 2023-09-11 RX ORDER — PANTOPRAZOLE SODIUM 40 MG/10ML
80 INJECTION, POWDER, LYOPHILIZED, FOR SOLUTION INTRAVENOUS ONCE
Status: COMPLETED | OUTPATIENT
Start: 2023-09-11 | End: 2023-09-12

## 2023-09-11 RX ORDER — SODIUM CHLORIDE 9 MG/ML
INJECTION, SOLUTION INTRAVENOUS PRN
Status: DISCONTINUED | OUTPATIENT
Start: 2023-09-11 | End: 2023-09-13 | Stop reason: HOSPADM

## 2023-09-11 RX ORDER — PANTOPRAZOLE SODIUM 40 MG/10ML
40 INJECTION, POWDER, LYOPHILIZED, FOR SOLUTION INTRAVENOUS ONCE
Status: DISCONTINUED | OUTPATIENT
Start: 2023-09-11 | End: 2023-09-11

## 2023-09-11 ASSESSMENT — ENCOUNTER SYMPTOMS
SORE THROAT: 0
SHORTNESS OF BREATH: 1
DIARRHEA: 0
RHINORRHEA: 0
NAUSEA: 0
ABDOMINAL PAIN: 0
VOMITING: 0
COUGH: 0
BACK PAIN: 0

## 2023-09-12 PROBLEM — C80.1 CANCER (HCC): Status: ACTIVE | Noted: 2023-09-12

## 2023-09-12 PROBLEM — E46 PROTEIN-CALORIE MALNUTRITION (HCC): Status: ACTIVE | Noted: 2023-09-12

## 2023-09-12 LAB
ABO/RH: NORMAL
AMMONIA PLAS-SCNC: 13 UMOL/L (ref 11–51)
ANION GAP SERPL CALCULATED.3IONS-SCNC: 9 MMOL/L (ref 7–19)
ANTIBODY SCREEN: NORMAL
BASO STIPL BLD QL SMEAR: ABNORMAL
BASOPHILS # BLD: 0 K/UL (ref 0–0.2)
BASOPHILS NFR BLD: 0.6 % (ref 0–1)
BLOOD BANK DISPENSE STATUS: NORMAL
BLOOD BANK PRODUCT CODE: NORMAL
BPU ID: NORMAL
BUN SERPL-MCNC: 43 MG/DL (ref 8–23)
CALCIUM SERPL-MCNC: 8.8 MG/DL (ref 8.2–9.6)
CHLORIDE SERPL-SCNC: 103 MMOL/L (ref 98–111)
CO2 SERPL-SCNC: 31 MMOL/L (ref 22–29)
CREAT SERPL-MCNC: 1.2 MG/DL (ref 0.5–0.9)
DESCRIPTION BLOOD BANK: NORMAL
EKG P AXIS: NORMAL DEGREES
EKG P-R INTERVAL: NORMAL MS
EKG Q-T INTERVAL: 310 MS
EKG QRS DURATION: 70 MS
EKG QTC CALCULATION (BAZETT): 385 MS
EKG T AXIS: 71 DEGREES
EOSINOPHIL # BLD: 0.1 K/UL (ref 0–0.6)
EOSINOPHIL NFR BLD: 1 % (ref 0–5)
ERYTHROCYTE [DISTWIDTH] IN BLOOD BY AUTOMATED COUNT: 18.2 % (ref 11.5–14.5)
FERRITIN SERPL-MCNC: 28.3 NG/ML (ref 13–150)
FOLATE SERPL-MCNC: >20 NG/ML (ref 4.8–37.3)
GLUCOSE SERPL-MCNC: 108 MG/DL (ref 74–109)
HAPTOGLOB SERPL-MCNC: 253 MG/DL (ref 30–200)
HCT VFR BLD AUTO: 29.2 % (ref 37–47)
HCT VFR BLD AUTO: 30.3 % (ref 37–47)
HGB BLD-MCNC: 8.3 G/DL (ref 12–16)
HGB BLD-MCNC: 8.7 G/DL (ref 12–16)
HYPOCHROMIA BLD QL SMEAR: ABNORMAL
IMM GRANULOCYTES # BLD: 0.1 K/UL
LACTATE BLDV-SCNC: 0.7 MMOL/L (ref 0.5–1.9)
LDH SERPL-CCNC: 207 U/L (ref 91–215)
LYMPHOCYTES # BLD: 0.5 K/UL (ref 1.1–4.5)
LYMPHOCYTES NFR BLD: 7.1 % (ref 20–40)
MCH RBC QN AUTO: 27.6 PG (ref 27–31)
MCHC RBC AUTO-ENTMCNC: 28.4 G/DL (ref 33–37)
MCV RBC AUTO: 97 FL (ref 81–99)
MONOCYTES # BLD: 1 K/UL (ref 0–0.9)
MONOCYTES NFR BLD: 14.4 % (ref 0–10)
NEUTROPHILS # BLD: 5.5 K/UL (ref 1.5–7.5)
NEUTS SEG NFR BLD: 75.9 % (ref 50–65)
PLATELET # BLD AUTO: 394 K/UL (ref 130–400)
PLATELET SLIDE REVIEW: ADEQUATE
PMV BLD AUTO: 8.8 FL (ref 9.4–12.3)
POLYCHROMASIA BLD QL SMEAR: ABNORMAL
POTASSIUM SERPL-SCNC: 5.1 MMOL/L (ref 3.5–5)
POTASSIUM SERPL-SCNC: 5.3 MMOL/L (ref 3.5–5)
RBC # BLD AUTO: 3.01 M/UL (ref 4.2–5.4)
SODIUM SERPL-SCNC: 143 MMOL/L (ref 136–145)
VIT B12 SERPL-MCNC: 601 PG/ML (ref 211–946)
WBC # BLD AUTO: 7.2 K/UL (ref 4.8–10.8)

## 2023-09-12 PROCEDURE — 99222 1ST HOSP IP/OBS MODERATE 55: CPT | Performed by: PHYSICIAN ASSISTANT

## 2023-09-12 PROCEDURE — 2700000000 HC OXYGEN THERAPY PER DAY

## 2023-09-12 PROCEDURE — 92610 EVALUATE SWALLOWING FUNCTION: CPT

## 2023-09-12 PROCEDURE — 99223 1ST HOSP IP/OBS HIGH 75: CPT | Performed by: INTERNAL MEDICINE

## 2023-09-12 PROCEDURE — 94760 N-INVAS EAR/PLS OXIMETRY 1: CPT

## 2023-09-12 PROCEDURE — 6370000000 HC RX 637 (ALT 250 FOR IP): Performed by: HOSPITALIST

## 2023-09-12 PROCEDURE — 94640 AIRWAY INHALATION TREATMENT: CPT

## 2023-09-12 PROCEDURE — 85025 COMPLETE CBC W/AUTO DIFF WBC: CPT

## 2023-09-12 PROCEDURE — 80048 BASIC METABOLIC PNL TOTAL CA: CPT

## 2023-09-12 PROCEDURE — 6360000002 HC RX W HCPCS: Performed by: HOSPITALIST

## 2023-09-12 PROCEDURE — 93010 ELECTROCARDIOGRAM REPORT: CPT | Performed by: INTERNAL MEDICINE

## 2023-09-12 PROCEDURE — 85018 HEMOGLOBIN: CPT

## 2023-09-12 PROCEDURE — 82140 ASSAY OF AMMONIA: CPT

## 2023-09-12 PROCEDURE — C9113 INJ PANTOPRAZOLE SODIUM, VIA: HCPCS | Performed by: HOSPITALIST

## 2023-09-12 PROCEDURE — 85014 HEMATOCRIT: CPT

## 2023-09-12 PROCEDURE — 84132 ASSAY OF SERUM POTASSIUM: CPT

## 2023-09-12 PROCEDURE — 1210000000 HC MED SURG R&B

## 2023-09-12 PROCEDURE — 36415 COLL VENOUS BLD VENIPUNCTURE: CPT

## 2023-09-12 PROCEDURE — 83605 ASSAY OF LACTIC ACID: CPT

## 2023-09-12 PROCEDURE — 2580000003 HC RX 258: Performed by: HOSPITALIST

## 2023-09-12 PROCEDURE — 92522 EVALUATE SPEECH PRODUCTION: CPT

## 2023-09-12 RX ORDER — SODIUM CHLORIDE 0.9 % (FLUSH) 0.9 %
5-40 SYRINGE (ML) INJECTION PRN
Status: DISCONTINUED | OUTPATIENT
Start: 2023-09-12 | End: 2023-09-13 | Stop reason: HOSPADM

## 2023-09-12 RX ORDER — ONDANSETRON 4 MG/1
4 TABLET, ORALLY DISINTEGRATING ORAL EVERY 8 HOURS PRN
Status: DISCONTINUED | OUTPATIENT
Start: 2023-09-12 | End: 2023-09-13 | Stop reason: HOSPADM

## 2023-09-12 RX ORDER — SIMETHICONE 80 MG
80 TABLET,CHEWABLE ORAL EVERY 6 HOURS PRN
Status: DISCONTINUED | OUTPATIENT
Start: 2023-09-12 | End: 2023-09-13 | Stop reason: HOSPADM

## 2023-09-12 RX ORDER — ATORVASTATIN CALCIUM 10 MG/1
10 TABLET, FILM COATED ORAL NIGHTLY
Status: DISCONTINUED | OUTPATIENT
Start: 2023-09-12 | End: 2023-09-13 | Stop reason: HOSPADM

## 2023-09-12 RX ORDER — LIDOCAINE 4 G/G
1 PATCH TOPICAL DAILY
Status: DISCONTINUED | OUTPATIENT
Start: 2023-09-12 | End: 2023-09-13 | Stop reason: HOSPADM

## 2023-09-12 RX ORDER — SODIUM CHLORIDE 0.9 % (FLUSH) 0.9 %
5-40 SYRINGE (ML) INJECTION EVERY 12 HOURS SCHEDULED
Status: DISCONTINUED | OUTPATIENT
Start: 2023-09-12 | End: 2023-09-13 | Stop reason: HOSPADM

## 2023-09-12 RX ORDER — CARBOXYMETHYLCELLULOSE SODIUM 5 MG/ML
1 SOLUTION/ DROPS OPHTHALMIC 3 TIMES DAILY PRN
Status: DISCONTINUED | OUTPATIENT
Start: 2023-09-12 | End: 2023-09-13 | Stop reason: HOSPADM

## 2023-09-12 RX ORDER — DILTIAZEM HYDROCHLORIDE 120 MG/1
240 CAPSULE, COATED, EXTENDED RELEASE ORAL DAILY
Status: DISCONTINUED | OUTPATIENT
Start: 2023-09-12 | End: 2023-09-13 | Stop reason: HOSPADM

## 2023-09-12 RX ORDER — GUAIFENESIN 600 MG/1
600 TABLET, EXTENDED RELEASE ORAL 2 TIMES DAILY
Status: DISCONTINUED | OUTPATIENT
Start: 2023-09-12 | End: 2023-09-13 | Stop reason: HOSPADM

## 2023-09-12 RX ORDER — MEGESTROL ACETATE 40 MG/ML
200 SUSPENSION ORAL 2 TIMES DAILY
Status: DISCONTINUED | OUTPATIENT
Start: 2023-09-12 | End: 2023-09-13 | Stop reason: HOSPADM

## 2023-09-12 RX ORDER — SODIUM CHLORIDE 9 MG/ML
INJECTION, SOLUTION INTRAVENOUS PRN
Status: DISCONTINUED | OUTPATIENT
Start: 2023-09-12 | End: 2023-09-13 | Stop reason: HOSPADM

## 2023-09-12 RX ORDER — CIPROFLOXACIN 2 MG/ML
400 INJECTION, SOLUTION INTRAVENOUS EVERY 12 HOURS
Status: DISCONTINUED | OUTPATIENT
Start: 2023-09-12 | End: 2023-09-12

## 2023-09-12 RX ORDER — IPRATROPIUM BROMIDE AND ALBUTEROL SULFATE 2.5; .5 MG/3ML; MG/3ML
1 SOLUTION RESPIRATORY (INHALATION)
Status: DISCONTINUED | OUTPATIENT
Start: 2023-09-12 | End: 2023-09-13 | Stop reason: HOSPADM

## 2023-09-12 RX ORDER — CIPROFLOXACIN 2 MG/ML
400 INJECTION, SOLUTION INTRAVENOUS EVERY 24 HOURS
Status: DISCONTINUED | OUTPATIENT
Start: 2023-09-13 | End: 2023-09-12

## 2023-09-12 RX ORDER — ONDANSETRON 2 MG/ML
4 INJECTION INTRAMUSCULAR; INTRAVENOUS EVERY 6 HOURS PRN
Status: DISCONTINUED | OUTPATIENT
Start: 2023-09-12 | End: 2023-09-13 | Stop reason: HOSPADM

## 2023-09-12 RX ORDER — METRONIDAZOLE 500 MG/100ML
500 INJECTION, SOLUTION INTRAVENOUS EVERY 8 HOURS
Status: DISCONTINUED | OUTPATIENT
Start: 2023-09-12 | End: 2023-09-12

## 2023-09-12 RX ADMIN — SODIUM ZIRCONIUM CYCLOSILICATE 10 G: 10 POWDER, FOR SUSPENSION ORAL at 06:40

## 2023-09-12 RX ADMIN — MEGESTROL ACETATE 200 MG: 40 SUSPENSION ORAL at 09:43

## 2023-09-12 RX ADMIN — IPRATROPIUM BROMIDE AND ALBUTEROL SULFATE 1 DOSE: 2.5; .5 SOLUTION RESPIRATORY (INHALATION) at 10:05

## 2023-09-12 RX ADMIN — IPRATROPIUM BROMIDE AND ALBUTEROL SULFATE 1 DOSE: 2.5; .5 SOLUTION RESPIRATORY (INHALATION) at 06:10

## 2023-09-12 RX ADMIN — SODIUM CHLORIDE 8 MG/HR: 9 INJECTION, SOLUTION INTRAVENOUS at 05:41

## 2023-09-12 RX ADMIN — MEGESTROL ACETATE 200 MG: 40 SUSPENSION ORAL at 20:47

## 2023-09-12 RX ADMIN — SODIUM CHLORIDE, PRESERVATIVE FREE 10 ML: 5 INJECTION INTRAVENOUS at 20:47

## 2023-09-12 RX ADMIN — IPRATROPIUM BROMIDE AND ALBUTEROL SULFATE 1 DOSE: 2.5; .5 SOLUTION RESPIRATORY (INHALATION) at 18:15

## 2023-09-12 RX ADMIN — METRONIDAZOLE 500 MG: 5 INJECTION, SOLUTION INTRAVENOUS at 02:51

## 2023-09-12 RX ADMIN — SODIUM CHLORIDE 8 MG/HR: 9 INJECTION, SOLUTION INTRAVENOUS at 20:46

## 2023-09-12 RX ADMIN — CIPROFLOXACIN 400 MG: 400 INJECTION, SOLUTION INTRAVENOUS at 01:11

## 2023-09-12 RX ADMIN — GUAIFENESIN 600 MG: 600 TABLET ORAL at 09:37

## 2023-09-12 RX ADMIN — IPRATROPIUM BROMIDE AND ALBUTEROL SULFATE 1 DOSE: 2.5; .5 SOLUTION RESPIRATORY (INHALATION) at 14:00

## 2023-09-12 RX ADMIN — SODIUM ZIRCONIUM CYCLOSILICATE 10 G: 10 POWDER, FOR SUSPENSION ORAL at 02:50

## 2023-09-12 RX ADMIN — Medication 5000 UNITS: at 09:37

## 2023-09-12 RX ADMIN — GUAIFENESIN 600 MG: 600 TABLET ORAL at 20:46

## 2023-09-12 RX ADMIN — ATORVASTATIN CALCIUM 10 MG: 10 TABLET, FILM COATED ORAL at 20:46

## 2023-09-12 RX ADMIN — PANTOPRAZOLE SODIUM 80 MG: 40 INJECTION, POWDER, FOR SOLUTION INTRAVENOUS at 01:02

## 2023-09-12 SDOH — ECONOMIC STABILITY: INCOME INSECURITY: IN THE PAST 12 MONTHS, HAS THE ELECTRIC, GAS, OIL, OR WATER COMPANY THREATENED TO SHUT OFF SERVICE IN YOUR HOME?: NO

## 2023-09-12 SDOH — ECONOMIC STABILITY: FOOD INSECURITY: WITHIN THE PAST 12 MONTHS, YOU WORRIED THAT YOUR FOOD WOULD RUN OUT BEFORE YOU GOT MONEY TO BUY MORE.: SOMETIMES TRUE

## 2023-09-12 SDOH — ECONOMIC STABILITY: INCOME INSECURITY: HOW HARD IS IT FOR YOU TO PAY FOR THE VERY BASICS LIKE FOOD, HOUSING, MEDICAL CARE, AND HEATING?: NOT HARD AT ALL

## 2023-09-12 ASSESSMENT — PATIENT HEALTH QUESTIONNAIRE - PHQ9
2. FEELING DOWN, DEPRESSED OR HOPELESS: 1
SUM OF ALL RESPONSES TO PHQ QUESTIONS 1-9: 3
SUM OF ALL RESPONSES TO PHQ QUESTIONS 1-9: 3
SUM OF ALL RESPONSES TO PHQ9 QUESTIONS 1 & 2: 3
1. LITTLE INTEREST OR PLEASURE IN DOING THINGS: 2
SUM OF ALL RESPONSES TO PHQ QUESTIONS 1-9: 3
SUM OF ALL RESPONSES TO PHQ QUESTIONS 1-9: 3

## 2023-09-12 ASSESSMENT — ENCOUNTER SYMPTOMS
SHORTNESS OF BREATH: 1
BLOOD IN STOOL: 0
CONSTIPATION: 0
COUGH: 1
DIARRHEA: 0
ABDOMINAL PAIN: 0
VOMITING: 0
ANAL BLEEDING: 0
NAUSEA: 0

## 2023-09-12 ASSESSMENT — PAIN SCALES - GENERAL: PAINLEVEL_OUTOF10: 2

## 2023-09-12 ASSESSMENT — PAIN DESCRIPTION - LOCATION: LOCATION: HIP

## 2023-09-12 NOTE — ED PROVIDER NOTES
[facility] EMERGENCY DEPT  eMERGENCY dEPARTMENT eNCOUnter      Pt Name: Beka Hurley  MRN: [id number]  [facility] 5/13/1932  Date of evaluation: 9/11/2023  Provider: Momo Sosa MD    [address]       Chief Complaint   Patient presents with    Abnormal Lab     Hbg 6.3 per nursing home[facility] in [place] on 2-3 L NC all the time per EMS         HISTORY OF PRESENT ILLNESS   (Location/Symptom, Timing/Onset,Context/Setting, Quality, Duration, Modifying Factors, Severity)  Note limiting factors. Beka Hurley is a 80 y.o. female who presents to the emergency department for abnormal lab. Patient from likely nursing home in [place] had lab work done today with hemoglobin of 6.3. Baseline hemoglobin around 9 most recent comparison 8.9 on September 3. Recently went to nursing home due to worsening generalized weakness and fatigue and difficulty ambulating. Has COPD and wears 2 to 3 L nasal cannula at baseline states her breathing slightly worse than usual.  Denies any chest pain. No cough fever or chills. Has dealt with anemia somewhat chronically. HPI    NursingNotes were reviewed. REVIEW OF SYSTEMS    (2-9 systems for level 4, 10 or more for level 5)     Review of Systems   Constitutional:  Positive for activity change and fatigue. Negative for chills and fever. HENT:  Negative for rhinorrhea and sore throat. Respiratory:  Positive for shortness of breath. Negative for cough. Cardiovascular:  Negative for chest pain and leg swelling. Gastrointestinal:  Negative for abdominal pain, diarrhea, nausea and vomiting. Genitourinary:  Negative for dysuria, frequency and urgency. Musculoskeletal:  Negative for back pain and neck pain. Neurological:  Negative for dizziness and headaches. All other systems reviewed and are negative.            PAST MEDICALHISTORY     Past Medical History:   Diagnosis Date    Abdominal aortic aneurysm (AAA) (HCC)     Anemia     iron deficiecy    Anxiety     Arthritis     CHF

## 2023-09-12 NOTE — CARE COORDINATION
09/12/23 0949   Readmission Assessment   Number of Days since last admission? 8-30 days   Previous Disposition SNF  ([facility] [place])   Who is being Interviewed Patient   What was the patient's/caregiver's perception as to why they think they needed to return back to the hospital? Other (Comment)  (Pt states her potassum was to high and blood was low.)   Did you visit your Primary Care Physician after you left the hospital, before you returned this time? Yes   Why weren't you able to visit your PCP? Other (Comment)  (Pt is in a [address])   Did you see a specialist, such as Cardiac, Pulmonary, Orthopedic Physician, etc. after you left the hospital? No   Who advised the patient to return to the hospital? Skilled Unit   Does the patient report anything that got in the way of taking their medications? No   In our efforts to provide the best possible care to you and others like you, can you think of anything that we could have done to help you after you left the hospital the first time, so that you might not have needed to return so soon? Improved written discharge instructions;Education on how to continue taking medications upon discharge     Second IMM given to patient and explained with patient verbalizing understanding. All questions and concerns addressed     Signed letter placed in pt soft chart  Patient declined waiting 4 hr period prior to discharge.   Electronically signed by CAMERON Lindsey on 9/12/2023 at 9:52 AM

## 2023-09-12 NOTE — CARE COORDINATION
Layo spoke to Lashell from [facility]  Ph: [phone] Fa: [phone]. Who reports Pt can admit back to SNF but will need a NEW PRE-CERT to return. Pt plans to return back to SNF facility. Pt requested to update her son Jana Zelaya. Sw spoke to Pt son Jana Zelaya who is agreeable to SNF after d/c. Pt son has provided another number for pt Johanna Glez [phone]. Pt mobile phone on current facesheet is Pt son old cell phone number. Electronically signed by CAMERON Alarcon on 9/12/2023 at 9:54 AM    Layo spoke to Burleigh with [facility] about [employer]. Lashell reports Pt would be able to receive hospice services at her facility.  Layo has updated Belinda Benton PA-C  Electronically signed by CAMERON Alarcon on 9/12/2023 at 10:32 AM

## 2023-09-12 NOTE — PLAN OF CARE
Nutrition Problem #1: Inadequate oral intake, Severe malnutrition, In context of chronic, non-illness related  Intervention: Food and/or Nutrient Delivery: Continue NPO  Nutritional

## 2023-09-12 NOTE — ACP (ADVANCE CARE PLANNING)
Advance Care Planning     Advance Care Planning (ACP) Physician/NP/PA (Provider) Conversation      Date of ACP Conversation:09/12/2023    Conversation Conducted with:   Patient with Decision Making [facility] Decision Maker:    Current Designated Health Care Decision Maker:    Primary Decision Maker: Chin Escalante/Arely - Child - [phone]    Care Preferences:    Ventilation:  No    Resuscitation:  No    Length of Voluntary ACP Conversation in minutes:  5 minutes included w/ total consult time     Adriana Villalobos PA-C

## 2023-09-12 NOTE — PLAN OF CARE
Problem: Safety - Adult  Goal: Free from fall injury  Outcome: Progressing     Problem: Pain  Goal: Verbalizes/displays adequate comfort level or baseline comfort level  Outcome: Progressing  Flowsheets (Taken 9/12/2023 0430)  Verbalizes/displays adequate comfort level or baseline comfort level:   Encourage patient to monitor pain and request assistance   Assess pain using appropriate pain scale   Administer analgesics based on type and severity of pain and evaluate response     Problem: Skin/Tissue Integrity  Goal: Absence of new skin breakdown  Description: 1. Monitor for areas of redness and/or skin breakdown  2. Assess vascular access sites hourly  3. Every 4-6 hours minimum:  Change oxygen saturation probe site  4. Every 4-6 hours:  If on nasal continuous positive airway pressure, respiratory therapy assess nares and determine need for appliance change or resting period.   Outcome: Progressing     Problem: ABCDS Injury Assessment  Goal: Absence of physical injury  Outcome: Progressing  Flowsheets (Taken 9/12/2023 0442)  Absence of Physical Injury: Implement safety measures based on patient assessment

## 2023-09-12 NOTE — CONSULTS
Palliative Care Consult Note    9/12/2023 7:24 AM  Subjective:  Admit Date: 9/11/2023  PCP: Venus Trejo MD    Date of Service: 9/12/2023    Reason for Consultation:  Goals of Care, Code Status, Family Support     History Obtained From: EMR/Patient and their Family    History Of Present Illness: The patient is a 80 y.o. female with PMH suspected lung cancer with known numerous bilateral solid and groundglass nodules previously declining work-up, AAA, CHF, COPD dependent on 3 L nasal cannula O2, ITP, hypertension who presented to who presented to [facility] ED on 09/11/2023 with concern for anemia. Outpatient labs at the nursing facility revealed a hemoglobin of 6.3. She was sent to the ER for further evaluation. She received 1 unit packed red blood cell and stool was guaiac positive. She has been admitted to hospitalist service with concern for GI bleeding. Gastroenterology has been consulted. She has been placed on Cipro Flagyl Protonix drip. Palliative care has been consulted for goals of care. During previous hospitalizations the patient was found to have numerous bilateral solid and groundglass nodules that is suspected to be a metastatic process. She has declined further cancer work-up stating she would not be seeking treatment. She has presented to the hospital multiple times with dyspnea, fatigue and decreased appetite. During her last hospital stay an appetite stimulant was added and she was discharged to a skilled nursing facility with skilled services. At the time she did not have coverage for room and board to discharge with hospice care. Hospice is following outpatient for when patient does decide to transition into hospice services.     Past Medical History:        Diagnosis Date    Abdominal aortic aneurysm (AAA) (HCC)     Anemia     iron deficiecy    Anxiety     Arthritis     CHF (congestive heart failure) (HCC)     COPD (chronic obstructive pulmonary disease) ([facility])     Depression     H/O

## 2023-09-12 NOTE — H&P
[facility] Hospital[facility]      Hospitalist - History & Physical      PCP: Venus Trejo MD    Date of Admission: 9/11/2023    Date of Service: 9/11/2023    Chief Complaint:  Abnormal lab    History Of Present Illness: The patient is a 80 y.o. female who presented to [facility] ED for evaluation of abnormal lab. Pt has history of iron deficiency anemia, copd requiring 3L of supplemental oxygen at nursing home, HTN and CHF. Pt was last discharged from this facility on 9/8/2023 to nursing home having had evaluation of atrial fibrillation with rvr on eliquis. She had outpatient labs performed today at nursing home with noted hgb of 6.3 sent here for further evaluation. Pt denies abdominal pain as well as concern for blood in stool. In ED, 1unit PRBC ordered, patient with brown stool on exam glove that was hemoccult positive per ED physician, hgb 7.1, wbc 6.7, platelets 164S, sodium 141, potassium 5.4, creatinine 1.3/bun 44, glucose 241, INR 1.48, ptt 28.8. Pt is admitted inpatient to hospitalist.     Past Medical History:        Diagnosis Date    Abdominal aortic aneurysm (AAA) ([address])     Anemia     iron deficiecy    Anxiety     Arthritis     CHF (congestive heart failure) (HCC)     COPD (chronic obstructive pulmonary disease) ([address])     Depression     H/O idiopathic thrombocytopenic purpura     Hypertension     Migraines     Palliative care patient 09/01/2023       Past Surgical History:        Procedure Laterality Date    BONE MARROW BIOPSY      COLONOSCOPY  05/28/2013    @[facility] per Abhinav TORRE AND BSO (CERVIX REMOVED)         Home Medications:  Prior to Admission medications    Medication Sig Start Date End Date Taking? Authorizing Provider   apixaban (ELIQUIS) 2.5 MG TABS tablet Take 1 tablet by mouth 2 times daily 9/8/23   ROSA ELENA Cruz CNP   clonazePAM (KLONOPIN) 0.5 MG tablet Take 1 tablet by mouth at bedtime for 3 days.  Max Daily Amount: 0.5 mg 9/8/23 9/11/23  ROSA ELENA Cruz CNP   megestrol

## 2023-09-13 VITALS
RESPIRATION RATE: 18 BRPM | WEIGHT: 100.38 LBS | DIASTOLIC BLOOD PRESSURE: 96 MMHG | HEIGHT: 64 IN | HEART RATE: 54 BPM | SYSTOLIC BLOOD PRESSURE: 128 MMHG | TEMPERATURE: 97.7 F | OXYGEN SATURATION: 97 % | BODY MASS INDEX: 17.14 KG/M2

## 2023-09-13 PROCEDURE — C9113 INJ PANTOPRAZOLE SODIUM, VIA: HCPCS | Performed by: HOSPITALIST

## 2023-09-13 PROCEDURE — 2700000000 HC OXYGEN THERAPY PER DAY

## 2023-09-13 PROCEDURE — 6360000002 HC RX W HCPCS: Performed by: HOSPITALIST

## 2023-09-13 PROCEDURE — 2580000003 HC RX 258: Performed by: HOSPITALIST

## 2023-09-13 PROCEDURE — 6370000000 HC RX 637 (ALT 250 FOR IP): Performed by: HOSPITALIST

## 2023-09-13 PROCEDURE — 99231 SBSQ HOSP IP/OBS SF/LOW 25: CPT | Performed by: PHYSICIAN ASSISTANT

## 2023-09-13 PROCEDURE — 94640 AIRWAY INHALATION TREATMENT: CPT

## 2023-09-13 PROCEDURE — 94760 N-INVAS EAR/PLS OXIMETRY 1: CPT

## 2023-09-13 RX ORDER — PANTOPRAZOLE SODIUM 40 MG/1
40 TABLET, DELAYED RELEASE ORAL
Qty: 60 TABLET | Refills: 0 | Status: SHIPPED | OUTPATIENT
Start: 2023-09-13

## 2023-09-13 RX ORDER — ONDANSETRON 4 MG/1
4 TABLET, ORALLY DISINTEGRATING ORAL EVERY 8 HOURS PRN
Qty: 9 TABLET | Refills: 0 | Status: SHIPPED | OUTPATIENT
Start: 2023-09-13

## 2023-09-13 RX ORDER — CLONAZEPAM 0.5 MG/1
0.5 TABLET ORAL NIGHTLY
Qty: 3 TABLET | Refills: 0 | Status: SHIPPED | OUTPATIENT
Start: 2023-09-13 | End: 2023-09-16

## 2023-09-13 RX ADMIN — IPRATROPIUM BROMIDE AND ALBUTEROL SULFATE 1 DOSE: 2.5; .5 SOLUTION RESPIRATORY (INHALATION) at 10:22

## 2023-09-13 RX ADMIN — IPRATROPIUM BROMIDE AND ALBUTEROL SULFATE 1 DOSE: 2.5; .5 SOLUTION RESPIRATORY (INHALATION) at 06:16

## 2023-09-13 RX ADMIN — SODIUM CHLORIDE, PRESERVATIVE FREE 10 ML: 5 INJECTION INTRAVENOUS at 09:50

## 2023-09-13 RX ADMIN — MEGESTROL ACETATE 200 MG: 40 SUSPENSION ORAL at 09:46

## 2023-09-13 RX ADMIN — Medication 5000 UNITS: at 09:46

## 2023-09-13 RX ADMIN — SODIUM CHLORIDE 8 MG/HR: 9 INJECTION, SOLUTION INTRAVENOUS at 06:02

## 2023-09-13 RX ADMIN — DILTIAZEM HYDROCHLORIDE 240 MG: 120 CAPSULE, COATED, EXTENDED RELEASE ORAL at 09:45

## 2023-09-13 RX ADMIN — GUAIFENESIN 600 MG: 600 TABLET ORAL at 09:45

## 2023-09-13 NOTE — DISCHARGE SUMMARY
Maida Khan  :  1932  MRN:  [id number]    Admit date:  2023  Discharge date:  2023    Discharging Physician:  Dr. Char Caballero Directive: DNR    Consults: Teresita Lennon TO GI  IP CONSULT TO [address] [place] [address]     Primary Care Physician:  Zurdo Carrero MD    Discharge Diagnoses:  Principal Problem:    GIB (gastrointestinal bleeding)  Active Problems:    COPD (chronic obstructive pulmonary disease) (HCC)    Severe malnutrition (HCC)    Acute on chronic anemia    Hyperkalemia    Cancer ([address])    Protein-calorie malnutrition ([address])  Resolved Problems:    * No resolved hospital problems. *      Portions of this note have been copied forward, however, changed to reflect the most current clinical status of this patient. Hospital Course: The patient is a 61-year-old female with past medical history of iron deficiency anemia, COPD requiring 3 L at baseline, hypertension, CHF, and atrial fibrillation who presented to [address] [place] [address] ED for evaluation of abnormal labs. Of note patient was discharged from this facility on 2023 to skilled facility after evaluation and treatment of atrial fibrillation with rapid ventricular rate. Patient was discharged on Eliquis. Patient had outpatient labs at skilled facility on day of admission was found to have hemoglobin of 6.3 and was sent to the ED for further evaluation. Patient denied abdominal pain, nausea, vomiting, or blood in stools. In the ED patient was found to have Hemoccult positive stool on gloved exam.  Hemoglobin was 6.1, potassium 5.4, creatinine 1.3. Patient was given 1 unit of blood and admitted to the hospitalist service for further evaluation. GI was consulted however patient declines further work-up. GI recommends Protonix 40 mg BID. Potassium improved with Lokelma. Palliative care on board and patient requesting hospice services at this time. Patient declines any further work-up.  She wishes to

## 2023-09-13 NOTE — PLAN OF CARE
Problem: Safety - Adult  Goal: Free from fall injury  Outcome: Progressing     Problem: Pain  Goal: Verbalizes/displays adequate comfort level or baseline comfort level  Outcome: Progressing     Problem: Skin/Tissue Integrity  Goal: Absence of new skin breakdown  Outcome: Progressing     Problem: Skin/Tissue Integrity  Goal: Absence of new skin breakdown  Outcome: Progressing     Problem: ABCDS Injury Assessment  Goal: Absence of physical injury  Outcome: Progressing     Problem: Nutrition Deficit:  Goal: Optimize nutritional status  Outcome: Progressing

## 2023-09-13 NOTE — PLAN OF CARE
Problem: Safety - Adult  Goal: Free from fall injury  9/13/2023 0835 by Chinyere Frost RN  Outcome: Adequate for Discharge  9/13/2023 9489 by Trae Peterson RN  Outcome: Progressing     Problem: Pain  Goal: Verbalizes/displays adequate comfort level or baseline comfort level  9/13/2023 0835 by Chinyere Frost RN  Outcome: Adequate for Discharge  9/13/2023 7363 by Trae Peterson RN  Outcome: Progressing     Problem: Skin/Tissue Integrity  Goal: Absence of new skin breakdown  Description: 1. Monitor for areas of redness and/or skin breakdown  2. Assess vascular access sites hourly  3. Every 4-6 hours minimum:  Change oxygen saturation probe site  4. Every 4-6 hours:  If on nasal continuous positive airway pressure, respiratory therapy assess nares and determine need for appliance change or resting period.   9/13/2023 0835 by Chinyere Frost RN  Outcome: Adequate for Discharge  9/13/2023 8765 by Trae Peterson RN  Outcome: Progressing     Problem: ABCDS Injury Assessment  Goal: Absence of physical injury  9/13/2023 0835 by Chinyere Frost RN  Outcome: Adequate for Discharge  9/13/2023 5999 by Trae Peterson RN  Outcome: Progressing     Problem: Nutrition Deficit:  Goal: Optimize nutritional status  9/13/2023 0835 by Chinyere Frost RN  Outcome: Adequate for Discharge  9/13/2023 9870 by Trae Peterson RN  Outcome: Progressing

## 2023-09-13 NOTE — PROGRESS NOTES
Palliative Care Progress Note  9/13/2023 11:45 AM    Patient:  Ambreen Jaime  YOB: 1932  Primary Care Physician: Heron Gaucher, MD  Advance Directive: DNR  Admit Date: 9/11/2023       Hospital Day: 2  Portions of this note have been copied forward, however, changed to reflect the most current clinical status of this patient. CHIEF COMPLAINT/REASON FOR CONSULTATION Goals of care, family support, Code status, symptom management     SUBJECTIVE:  Ms. Bar Elias has no new complaints. She feels tired and is hopeful to be discharged with no plans to return to the hospital in the future. HPI:  The patient is a 80 y.o. female with PMH suspected lung cancer with known numerous bilateral solid and groundglass nodules previously declining work-up, AAA, CHF, COPD dependent on 3 L nasal cannula O2, ITP, hypertension who presented to who presented to [facility] ED on 09/11/2023 with concern for anemia. Outpatient labs at the nursing facility revealed a hemoglobin of 6.3. She was sent to the ER for further evaluation. She received 1 unit packed red blood cell and stool was guaiac positive. She has been admitted to hospitalist service with concern for GI bleeding. Gastroenterology has been consulted. She has been placed on Cipro Flagyl Protonix drip. Palliative care has been consulted for goals of care. During previous hospitalizations the patient was found to have numerous bilateral solid and groundglass nodules that is suspected to be a metastatic process. She has declined further cancer work-up stating she would not be seeking treatment. She has presented to the hospital multiple times with dyspnea, fatigue and decreased appetite. During her last hospital stay an appetite stimulant was added and she was discharged to a skilled nursing facility with skilled services. At the time she did not have coverage for room and board to discharge with hospice care.   Hospice is following outpatient for when patient
Pharmacy Adjustment per [facility] protocol    Herlinda Hoyt is a 80 y.o. female. Pharmacy has adjusted medications per [facility] protocol. Recent Labs     09/11/23 2217   BUN 44*       Recent Labs     09/11/23 2217   CREATININE 1.3*       Estimated Creatinine Clearance: 20 mL/min (A) (based on SCr of 1.3 mg/dL (H)).     Height:   Ht Readings from Last 1 Encounters:   09/07/23 5' 4\" (1.626 m)     Weight:  Wt Readings from Last 1 Encounters:   09/07/23 98 lb 6 oz (44.6 kg)         Plan: Adjust the following medications based on [facility] protocol:           Ciprofloxacin to 400 mg IV every 24 hours    Electronically signed by Sydney Echavarria RPH on 9/12/2023 at 3:01 AM
Physical Therapy    Did not complete eval as pt is being d/c to snf on hospice.     Electronically signed by Mindi Zavala PT on 9/13/2023 at 11:13 AM
Physical Therapy  Name: Hector Carlos  MRN:  [id number]  Date of service:  9/12/2023    Pt. visiting with palliative. Will f/u at a later time.     Electronically signed by Georgina Arita PT on 9/12/2023 at 10:09 AM
Physical Therapy  Name: Zina Bueno  MRN:  [id number]  Date of service:  9/12/2023    Pt. declined therapy, stating she didn't want to get up now, and she doesn't care what the doctor said. She is going back to her doctor. Will f/u at a later time.     Electronically signed by Sarah Haley, PT on 9/12/2023 at 11:53 AM
Physician Progress Note      Veronica Regan  CSN #:                  [id number]  :                       1932  ADMIT DATE:       2023 9:57 PM  DISCH DATE:  RESPONDING  PROVIDER #:        Zander CUBA          QUERY TEXT:    Pt admitted with GIB. Pt noted to have COPD with chronic O2 use. If possible,   please document in the progress notes and discharge summary if you are   evaluating and/or treating any of the following: The medical record reflects the following:  Risk Factors: COPD  Clinical Indicators: chronic O2 use ATC  Treatment: O2 at 2l/3l per NC    Thank you,  Edwina Brownlee, [facility]  [phone]  Options provided:  -- Chronic respiratory failure with hypoxia  -- Chronic respiratory failure with hypercapnia  -- Chronic respiratory failure with hypoxia and hypercapnia  -- Other - I will add my own diagnosis  -- Disagree - Not applicable / Not valid  -- Disagree - Clinically unable to determine / Unknown  -- Refer to Clinical Documentation Reviewer    PROVIDER RESPONSE TEXT:    This patient has chronic respiratory failure with hypoxia.     Query created by: Alvarado Donohue on 2023 1:07 PM      Electronically signed by:  Zander CUBA 2023 12:49 AM
Please dc with a 3 day supply of current medications and any new med orders.
Pt c/o soa notified respiratory for ttx.
Resp coming to do tx.
Spoke with the pts son per phone to explain Hospice in the SNF. It is ok to dc the pt when medically ready. Hospice will follow after dc and will contact the son for adm to Hospice at the SNF.
[facility] Hospitalists      Patient:  Simin Aldrich  YOB: 1932  Date of Service: 9/12/2023  MRN: [id number]   Acct: [de-identified]   Primary Care Physician: Davi Samuel MD  Advance Directive: DNR  Admit Date: 9/11/2023       Hospital Day: 1  Portions of this note have been copied forward, however, changed to reflect the most current clinical status of this patient. CHIEF COMPLAINT abnormal labs    SUBJECTIVE:  Patient reports she is tired today. She reports she is \"tired of being messed with. \"    [address]:  The patient is a 54-year-old female with past medical history of iron deficiency anemia, COPD requiring 3 L at baseline, hypertension, CHF, and atrial fibrillation who presented to [facility] ED for evaluation of abnormal labs. Of note patient was discharged from this facility on 9/8/2023 to skilled facility after evaluation and treatment of atrial fibrillation with rapid ventricular rate. Patient was discharged on Eliquis. Patient had outpatient labs at skilled facility on day of admission was found to have hemoglobin of 6.3 and was sent to the ED for further evaluation. Patient denied abdominal pain, nausea, vomiting, or blood in stools. In the ED patient was found to have Hemoccult positive stool on gloved exam.  Hemoglobin was 6.1, potassium 5.4, creatinine 1.3. Patient was given 1 unit of blood and admitted to the hospitalist service for further evaluation. GI was consulted however patient declines further work-up. GI recommends Protonix 40 mg daily. Palliative care on board and patient requesting hospice services at this time. Social work has been consulted for discharge planning. Review of Systems:   Review of Systems   Constitutional:  Positive for activity change and fatigue. Respiratory:  Positive for cough and shortness of breath. Cardiovascular:  Negative for chest pain.    Gastrointestinal:  Negative for abdominal pain, constipation, diarrhea, nausea and
clear liquid status due to medical condition, severe loss of subcutaneous fat, severe muscle loss    Nutrition Interventions:   Food and/or Nutrient Delivery: Continue NPO  Nutrition Education/Counseling: No recommendation at this time  Coordination of Nutrition Care: Continue to monitor while inpatient       Goals:     Goals: Meet at least 75% of estimated needs       Nutrition Monitoring and Evaluation:   Behavioral-Environmental Outcomes: None Identified  Food/Nutrient Intake Outcomes: Diet Advancement/Tolerance  Physical Signs/Symptoms Outcomes: Biochemical Data, Weight, Skin, Fluid Status or Edema    Discharge Planning:     Too soon to determine     See Barboza MS, RD, LD  Contact: [phone]
Pharyngeal exercises;Diet tolerance monitoring; Therapeutic PO trials with SLP;Oral care;Oral motor exercises; Patient/Family education    Compensatory Swallowing Strategies  Compensatory Swallowing Strategies : Alternate solids and liquids;Eat/Feed slowly; No straws;Upright as possible for all oral intake;Remain upright for 30-45 minutes after meals;Small bites/sips    Treatment/Goals  LTG  Goal 1: Patient will tolerate least restrictive diet with minimal overt s/s of aspiration/penetration during hospitalization. STG  Goal 1: Patient will tolerate easy to chew consistencies with thin liquids with minimal overt s/s of aspiration/penetration during hospitalization. Goal 2: Patient will demonstrate awareness of general aspiration precautions. Goal 3: Patient will participate in swallowing reassessments to ensure safest diet consistencies. Goal 4: Patient will independently complete daily oral hygiene protocol. General  Chart Reviewed: Yes  Behavior/Cognition: Alert; Cooperative  Temperature Spikes Noted: No  Respiratory Status: O2 via nasual cannula  O2 Device: Nasal cannula  Liters of Oxygen: 3 L  Communication Observation: Dysarthria  Follows Directions: Simple  Dentition: Dentures top;Dentures bottom (Dentures are not here.)  Patient Positioning: Upright in bed  Baseline Vocal Quality: Weak  Volitional Swallow: Delayed  Prior Dysphagia History: Does report difficulites taking medications. She prefers medications whole in applesauce. Consistencies Administered: Regular;Pureed; Thin - cup; Ice Chips    Oral Motor Deficits  Labial: Decreased rate  Dentition: Upper & lower dentures  Lingual: Decreased rate  Consistencies Administered: Regular;Pureed; Thin - cup; Ice Chips    Education  Patient Education Response: Verbalizes understanding    Communication Observation  Speech assessment completed on this date.  Mild dysarthria is present as characterized by decreased vocal intensity and decreased lingual and labial ROM,

## 2023-09-14 PROBLEM — R77.8 ELEVATED TROPONIN: Status: RESOLVED | Noted: 2023-08-15 | Resolved: 2023-09-14

## 2023-09-14 PROBLEM — R79.89 ELEVATED TROPONIN: Status: RESOLVED | Noted: 2023-08-15 | Resolved: 2023-09-14

## 2023-10-04 ENCOUNTER — TELEPHONE (OUTPATIENT)
Dept: CARDIOLOGY CLINIC | Age: 88
End: 2023-10-04

## 2023-10-11 ENCOUNTER — TELEPHONE (OUTPATIENT)
Dept: CARDIOLOGY CLINIC | Age: 88
End: 2023-10-11